# Patient Record
Sex: MALE | Race: WHITE | Employment: FULL TIME | URBAN - METROPOLITAN AREA
[De-identification: names, ages, dates, MRNs, and addresses within clinical notes are randomized per-mention and may not be internally consistent; named-entity substitution may affect disease eponyms.]

---

## 2017-01-10 ENCOUNTER — LAB ONLY (OUTPATIENT)
Dept: ENDOCRINOLOGY | Age: 32
End: 2017-01-10

## 2017-01-10 DIAGNOSIS — E11.9 TYPE 2 DIABETES MELLITUS WITHOUT COMPLICATION, WITHOUT LONG-TERM CURRENT USE OF INSULIN (HCC): Primary | ICD-10-CM

## 2017-01-11 LAB
ALBUMIN SERPL-MCNC: 4.7 G/DL (ref 3.5–5.5)
ALBUMIN/GLOB SERPL: 1.5 {RATIO} (ref 1.1–2.5)
ALP SERPL-CCNC: 71 IU/L (ref 39–117)
ALT SERPL-CCNC: 52 IU/L (ref 0–44)
AST SERPL-CCNC: 26 IU/L (ref 0–40)
BILIRUB SERPL-MCNC: 0.2 MG/DL (ref 0–1.2)
BUN SERPL-MCNC: 15 MG/DL (ref 6–20)
BUN/CREAT SERPL: 16 (ref 8–19)
CALCIUM SERPL-MCNC: 9.7 MG/DL (ref 8.7–10.2)
CHLORIDE SERPL-SCNC: 99 MMOL/L (ref 96–106)
CHOLEST SERPL-MCNC: 175 MG/DL (ref 100–199)
CO2 SERPL-SCNC: 20 MMOL/L (ref 18–29)
CREAT SERPL-MCNC: 0.92 MG/DL (ref 0.76–1.27)
EST. AVERAGE GLUCOSE BLD GHB EST-MCNC: 126 MG/DL
GLOBULIN SER CALC-MCNC: 3.2 G/DL (ref 1.5–4.5)
GLUCOSE SERPL-MCNC: 106 MG/DL (ref 65–99)
HBA1C MFR BLD: 6 % (ref 4.8–5.6)
HDLC SERPL-MCNC: 29 MG/DL
INTERPRETATION, 910389: NORMAL
LDLC SERPL CALC-MCNC: 115 MG/DL (ref 0–99)
POTASSIUM SERPL-SCNC: 4.4 MMOL/L (ref 3.5–5.2)
PROT SERPL-MCNC: 7.9 G/DL (ref 6–8.5)
SODIUM SERPL-SCNC: 141 MMOL/L (ref 134–144)
TRIGL SERPL-MCNC: 156 MG/DL (ref 0–149)
VLDLC SERPL CALC-MCNC: 31 MG/DL (ref 5–40)

## 2017-01-13 ENCOUNTER — OFFICE VISIT (OUTPATIENT)
Dept: ENDOCRINOLOGY | Age: 32
End: 2017-01-13

## 2017-01-13 VITALS
HEART RATE: 72 BPM | BODY MASS INDEX: 29.62 KG/M2 | HEIGHT: 69 IN | WEIGHT: 200 LBS | SYSTOLIC BLOOD PRESSURE: 101 MMHG | DIASTOLIC BLOOD PRESSURE: 70 MMHG

## 2017-01-13 DIAGNOSIS — E11.9 TYPE 2 DIABETES MELLITUS WITHOUT COMPLICATION, WITHOUT LONG-TERM CURRENT USE OF INSULIN (HCC): Primary | ICD-10-CM

## 2017-01-13 RX ORDER — METFORMIN HYDROCHLORIDE 500 MG/1
TABLET, EXTENDED RELEASE ORAL
Qty: 120 TAB | Refills: 11 | Status: SHIPPED | OUTPATIENT
Start: 2017-01-13 | End: 2017-10-05

## 2017-01-13 NOTE — MR AVS SNAPSHOT
Visit Information Date & Time Provider Department Dept. Phone Encounter #  
 1/13/2017 11:50 AM Enma Collins, [address] [place] [address] Diabetes and Endocrinology [phone] [id number] Follow-up Instructions Return in about 3 months (around 4/13/2017). Upcoming Health Maintenance Date Due  
 EYE EXAM RETINAL OR DILATED Q1 12/13/1996 Pneumococcal 19-64 Medium Risk (1 of 1 - PPSV23) 12/13/2005 DTaP/Tdap/Td series (1 - Tdap) 12/13/2007 INFLUENZA AGE 9 TO ADULT 8/1/2016 FOOT EXAM Q1 6/8/2017 HEMOGLOBIN A1C Q6M 7/10/2017 MICROALBUMIN Q1 10/11/2017 LIPID PANEL Q1 1/10/2018 Allergies as of 1/13/2017  Review Complete On: 1/13/2017 By: Enma Collins MD  
 No Known Allergies Current Immunizations  Never Reviewed No immunizations on file. Not reviewed this visit You Were Diagnosed With   
  
 Codes Comments Type 2 diabetes mellitus without complication, without long-term current use of insulin ([facility])    -  Primary ICD-10-CM: E11.9 ICD-9-CM: 250.00 Vitals BP Pulse Height(growth percentile) Weight(growth percentile) BMI Smoking Status 101/70 72 5' 9\" (1.753 m) 200 lb (90.7 kg) 29.53 kg/m2 Never Smoker Vitals History BMI and BSA Data Body Mass Index Body Surface Area  
 29.53 kg/m 2 2.1 m 2 Preferred Pharmacy Pharmacy Name Phone [facility]ia [facility] [address] [place] [address] Dr Ruth, [address] [place] [address]. [phone] Your Updated Medication List  
  
   
This list is accurate as of: 1/13/17 12:27 PM.  Always use your most recent med list.  
  
  
  
  
 CLARITIN 10 mg tablet Generic drug:  loratadine Take 10 mg by mouth daily. seasonally  
  
 metFORMIN  mg tablet Commonly known as:  GLUCOPHAGE XR Take 2 tabs with breakfast and 2 tabs with dinner. Prescriptions Sent to Pharmacy  Refills  
 metFORMIN ER (GLUCOPHAGE XR) 500 mg tablet 11  
 Sig: Take 2 tabs with breakfast and 2 tabs with dinner. Class: Normal  
 Pharmacy: Kristin Blizzard [address], [address] [place] [address].  #: [phone] We Performed the Following  DIABETES FOOT EXAM [7 Custom] Follow-up Instructions Return in about 3 months (around 4/13/2017). Introducing [facility]! Daljit Carnes introduces [employer] patient portal. Now you can access parts of your medical record, email your doctor's office, and request medication refills online. 1. In your internet browser, go to [email or web address] "[email or web address] 2. Click on the First Time User? Click Here link in the Sign In box. You will see the New Member Sign Up page. 3. Enter your [employer] Access Code exactly as it appears below. You will not need to use this code after youve completed the sign-up process. If you do not sign up before the expiration date, you must request a new code. · [employer] Access Code: [email or web address] Expires: 4/13/2017 12:27 PM 
 
4. Enter the last four digits of your Social Security Number (xxxx) and Date of Birth (mm/dd/yyyy) as indicated and click Submit. You will be taken to the next sign-up page. 5. Create a [employer] ID. This will be your [employer] login ID and cannot be changed, so think of one that is secure and easy to remember. 6. Create a [employer] password. You can change your password at any time. 7. Enter your Password Reset Question and Answer. This can be used at a later time if you forget your password. 8. Enter your e-mail address. You will receive e-mail notification when new information is available in [address]. 9. Click Sign Up. You can now view and download portions of your medical record. 10. Click the Download Summary menu link to download a portable copy of your medical information. If you have questions, please visit the Frequently Asked Questions section of the [employer] website.  Remember, [employer] is NOT to be used for urgent needs. For medical emergencies, dial 911. Now available from your iPhone and Android! Please provide this summary of care documentation to your next provider. Your primary care clinician is listed as NONE. If you have any questions after today's visit, please call [phone].

## 2017-01-13 NOTE — PROGRESS NOTES
Chief Complaint   Patient presents with    Diabetes     PCP is [facility]. Eye exam many years ago. History of Present Illness: Jenn Salomon. is a 28 y.o. male here for follow up of diabetes. Pt notes in January 2016 he was having issues of polyuria and polydipsia and was told by his friend, who is diabetic, was told he was probably diabetic as well. He notes that he checked his BG that day was 500. The next week he went to [facility] and his FGB was in the mid-200's and he was started on Metformin 500mg BID. A month later his FBG was 180's and his A1C was 10.6%. At our initial visit in March 2016 I tested pt for FRANK and Anti-insulin Ab, both of which were negative, his insulin and C-peptide levels were normal.  We switched him to Metformin XR 1000mg BID, because he was not tolerating the Metformin IR. At our last visit in October 2016 he was on Metformin XR 1000mg BID, his BGs were running in the 's range with no hypoglycemia. He notes he is tolerating the Metformin XR 1000mg BID. He has cut out simple starches and processed starches. His diet is primarily meat, fruit and vegetables. His BGs have been under 130 consistently. He denies issues of BG under 70 or symptoms of hypoglycemia. He has lost 10 pounds since October 2016. Pt is eating 2 meals daily. He wakes around 10-11AM.  His first meal of the day is around 10AM, yesterday he had eggs, peppers, onions and cheese and diet cranberry juice  His second meal of the day is typically around 6-7PM, yesterday he had two ground chicken patties and a salad (no buns) and diet soda. He will occasionally snack on popcorn or vegetables. Exercise consists of walking and hobbies of \"knife making\". He has since completed his first knife. He notes he wants to upgrade his equipment and \"make them real good\". No history of vascular disease. No history of retinopathy, neuropathy, or nephropathy.    Pt has hx of UC but not symptoms since the age of 17. Pt has been diagnosed with SHARON but is not using CPAP. Last saw his eye doctor 3 years ago. Current Outpatient Prescriptions   Medication Sig    metFORMIN ER (GLUCOPHAGE XR) 500 mg tablet Take 2 tabs with breakfast and 2 tabs with dinner.  loratadine (CLARITIN) 10 mg tablet Take 10 mg by mouth daily. seasonally     No current facility-administered medications for this visit. No Known Allergies  Review of Systems:  - Eyes: no blurry vision or double vision  - Cardiovascular: no chest pain  - Respiratory: no shortness of breath  - Musculoskeletal: no myalgias  - Neurological: no numbness/tingling in extremities    Physical Examination:  Blood pressure 101/70, pulse 72, height 5' 9\" (1.753 m), weight 200 lb (90.7 kg). - General: pleasant, no distress, good eye contact   - Neck: no carotid bruits  - Cardiovascular: regular, normal rate, nl s1 and s2, no m/r/g, 2+ DP pulses   - Respiratory: clear bilaterally  - Integumentary: no edema, no foot ulcers, sensation to monofilament and vibration intact bilaterally  - Psychiatric: normal mood and affect    Data Reviewed:   Component      Latest Ref Rng & Units 1/10/2017 1/10/2017 1/10/2017          11:13 AM 11:13 AM 11:13 AM   Glucose      65 - 99 mg/dL 106 (H)     BUN      6 - 20 mg/dL 15     Creatinine      0.76 - 1.27 mg/dL 0.92     GFR est non-AA      >59 mL/min/1.73 111     GFR est AA      >59 mL/min/1.73 129     BUN/Creatinine ratio      8 - 19 16     Sodium      134 - 144 mmol/L 141     Potassium      3.5 - 5.2 mmol/L 4.4     Chloride      96 - 106 mmol/L 99     CO2      18 - 29 mmol/L 20     Calcium      8.7 - 10.2 mg/dL 9.7     Protein, total      6.0 - 8.5 g/dL 7.9     Albumin      3.5 - 5.5 g/dL 4.7     GLOBULIN, TOTAL      1.5 - 4.5 g/dL 3.2     A-G Ratio      1.1 - 2.5 1.5     Bilirubin, total      0.0 - 1.2 mg/dL 0.2     Alk.  phosphatase      39 - 117 IU/L 71     AST      0 - 40 IU/L 26     ALT      0 - 44 IU/L 52 (H) Cholesterol, total      100 - 199 mg/dL  175    Triglyceride      0 - 149 mg/dL  156 (H)    HDL Cholesterol      >39 mg/dL  29 (L)    VLDL, calculated      5 - 40 mg/dL  31    LDL, calculated      0 - 99 mg/dL  115 (H)    Hemoglobin A1c, (calculated)      4.8 - 5.6 %   6.0 (H)   Estimated average glucose      mg/dL   126       Assessment/Plan:   1) DM > His BGs are at goal. Pt encouraged to keep up the good work. His BP is at goal on no medications. His LDL is 115 but . Pt is 29 y/o, his father has hx of CAD. We discussed risks and most recent guidelines and research. Will not start anti-lipid medications at this time, but will monitor his lipid periodically. RTC 3 months    Pt voices understanding and agreement with the plan. Follow-up Disposition:  Return in about 3 months (around 4/13/2017).

## 2017-04-14 ENCOUNTER — OFFICE VISIT (OUTPATIENT)
Dept: ENDOCRINOLOGY | Age: 32
End: 2017-04-14

## 2017-04-14 VITALS
DIASTOLIC BLOOD PRESSURE: 67 MMHG | BODY MASS INDEX: 28.35 KG/M2 | HEART RATE: 81 BPM | HEIGHT: 69 IN | WEIGHT: 191.4 LBS | SYSTOLIC BLOOD PRESSURE: 113 MMHG

## 2017-04-14 DIAGNOSIS — E11.9 TYPE 2 DIABETES MELLITUS WITHOUT COMPLICATION, WITHOUT LONG-TERM CURRENT USE OF INSULIN (HCC): Primary | ICD-10-CM

## 2017-04-14 LAB — HBA1C MFR BLD HPLC: 5.4 %

## 2017-04-14 RX ORDER — BISMUTH SUBSALICYLATE 262 MG
1 TABLET,CHEWABLE ORAL DAILY
COMMUNITY
End: 2018-06-19 | Stop reason: ALTCHOICE

## 2017-04-14 RX ORDER — LANOLIN ALCOHOL/MO/W.PET/CERES
1000 CREAM (GRAM) TOPICAL DAILY
COMMUNITY
End: 2018-07-19

## 2017-04-14 NOTE — PROGRESS NOTES
Chief Complaint   Patient presents with    Diabetes     [facility] is PCP. Pharmacy verified. No eye exam in years     History of Present Illness: Myranda Key. is a 28 y.o. male here for follow up of diabetes. Pt notes in January 2016 he was having issues of polyuria and polydipsia and was told by his friend, who is diabetic, was told he was probably diabetic as well. He notes that he checked his BG that day was 500. The next week he went to [facility] and his FGB was in the mid-200's and he was started on Metformin 500mg BID. A month later his FBG was 180's and his A1C was 10.6%. At our initial visit in March 2016 I tested pt for FRANK and Anti-insulin Ab, both of which were negative, his insulin and C-peptide levels were normal.  We switched him to Metformin XR 1000mg BID, because he was not tolerating the Metformin IR. At our last visit in January 2017 his A1C was 6.0% on Metformin XR 1000mg BID. His A1C today was 5.4%. Pt has lost an additional 9 pounds since January 2017. He has been changing his diet by cutting out processed foods and simple carbs. He has been eating more protein and vegetables. He notes he is tolerating the Metformin XR 1000mg BID. His BGs have been in the 80-90's. He denies issues of BG under 70 or symptoms of hypoglycemia. Pt is eating 2-3 meals daily. He is trying to increase his protein intake. He notes that with more meat he has had more constipation issues. He wakes around 8-10AM.  His first meal of the day is around 8-Noon, yesterday he had yogurt, sausage, onions, peppers and an apple. His second meal is around 4-5PM, when he is not at work he has \"fruit and meat\" at work he is having chicken tenders and coffee or water. His has dinner around 10PM, last night he had turkey burger patties, avocado and watermelon. He will occasionally snack on popcorn or vegetables. Exercise consists of walking and hobbies of \"knife making\".  He has since completed his first knife. He notes he wants to upgrade his equipment and \"make them real good\". No history of vascular disease. No history of retinopathy, neuropathy, or nephropathy. Pt has hx of UC but not symptoms since the age of 17. Pt has been diagnosed with SHARON but is not using CPAP. Last saw his eye doctor 4 years ago. In January 2017 his LDL is 115 but . Pt is 27 y/o, his father has hx of CAD. We discussed risks and most recent guidelines and research. Will not start anti-lipid medications at this time, but will monitor his lipid periodically. Current Outpatient Prescriptions   Medication Sig    cyanocobalamin (VITAMIN B-12) 1,000 mcg tablet Take 1,000 mcg by mouth daily. Unsure of exact strength    multivitamin (ONE A DAY) tablet Take 1 Tab by mouth daily.  metFORMIN ER (GLUCOPHAGE XR) 500 mg tablet Take 2 tabs with breakfast and 2 tabs with dinner.  loratadine (CLARITIN) 10 mg tablet Take 10 mg by mouth daily. seasonally     No current facility-administered medications for this visit. No Known Allergies  Review of Systems:  - Eyes: no blurry vision or double vision  - Cardiovascular: no chest pain  - Respiratory: no shortness of breath  - Musculoskeletal: no myalgias  - Neurological: no numbness/tingling in extremities    Physical Examination:  Height 5' 9\" (1.753 m), weight 191 lb 6.4 oz (86.8 kg). - General: pleasant, no distress, good eye contact   - Neck: no carotid bruits  - Cardiovascular: regular, normal rate, nl s1 and s2, no m/r/g, 2+ DP pulses   - Respiratory: clear bilaterally  - Integumentary: no edema, no foot ulcers, sensation to monofilament and vibration intact bilaterally  - Psychiatric: normal mood and affect    Data Reviewed:   His A1C today was 5.4%. Assessment/Plan:   1) DM > His A1C today was 5.4%, and he has lost an additional 9 pounds since January 2017. His BGs are at goal. Pt encouraged to keep up the good work.   His BP is at goal on no medications. His LDL is 115 but . Pt is 27 y/o, his father has hx of CAD. We discussed risks and most recent guidelines and research. Will not start anti-lipid medications at this time, but will monitor his lipid periodically. RTC 6 months    Pt voices understanding and agreement with the plan. Follow-up Disposition:  Return in about 6 months (around 10/14/2017).

## 2017-04-14 NOTE — MR AVS SNAPSHOT
Visit Information Date & Time Provider Department Dept. Phone Encounter #  
 4/14/2017  8:50 AM Olaf Das MD [facility] [phone] [id number] Follow-up Instructions Return in about 6 months (around 10/14/2017). Upcoming Health Maintenance Date Due  
 EYE EXAM RETINAL OR DILATED Q1 12/13/1996 Pneumococcal 19-64 Medium Risk (1 of 1 - PPSV23) 12/13/2005 DTaP/Tdap/Td series (1 - Tdap) 12/13/2007 INFLUENZA AGE 9 TO ADULT 8/1/2016 HEMOGLOBIN A1C Q6M 7/10/2017 MICROALBUMIN Q1 10/11/2017 LIPID PANEL Q1 1/10/2018 FOOT EXAM Q1 1/13/2018 Allergies as of 4/14/2017  Review Complete On: 4/14/2017 By: Olaf Das MD  
 No Known Allergies Current Immunizations  Never Reviewed No immunizations on file. Not reviewed this visit You Were Diagnosed With   
  
 Codes Comments Type 2 diabetes mellitus without complication, without long-term current use of insulin ([facility])    -  Primary ICD-10-CM: E11.9 ICD-9-CM: 250.00 Vitals BP Pulse Height(growth percentile) Weight(growth percentile) BMI Smoking Status 113/67 81 5' 9\" (1.753 m) 191 lb 6.4 oz (86.8 kg) 28.26 kg/m2 Never Smoker Vitals History BMI and BSA Data Body Mass Index Body Surface Area  
 28.26 kg/m 2 2.06 m 2 Preferred Pharmacy Pharmacy Name Phone [facility] [address] Dr Ruth, [address] [place] [address]. [phone] Your Updated Medication List  
  
   
This list is accurate as of: 4/14/17  9:16 AM.  Always use your most recent med list.  
  
  
  
  
 CLARITIN 10 mg tablet Generic drug:  loratadine Take 10 mg by mouth daily. seasonally  
  
 metFORMIN  mg tablet Commonly known as:  GLUCOPHAGE XR Take 2 tabs with breakfast and 2 tabs with dinner. multivitamin tablet Commonly known as:  ONE A DAY Take 1 Tab by mouth daily. VITAMIN B-12 1,000 mcg tablet Generic drug:  cyanocobalamin Take 1,000 mcg by mouth daily. Unsure of exact strength We Performed the Following AMB POC HEMOGLOBIN A1C [07763 CPT(R)] HM DIABETES FOOT EXAM [HM7 Custom] MD COLLECTION VENOUS BLOOD,VENIPUNCTURE [id number] CPT(R)] Follow-up Instructions Return in about 6 months (around 10/14/2017). Introducing [facility]! Julissa Jesus introduces [employer] patient portal. Now you can access parts of your medical record, email your doctor's office, and request medication refills online. 1. In your internet browser, go to [email or web address] 2. Click on the First Time User? Click Here link in the Sign In box. You will see the New Member Sign Up page. 3. Enter your [employer] Access Code exactly as it appears below. You will not need to use this code after youve completed the sign-up process. If you do not sign up before the expiration date, you must request a new code. · [employer] Access Code: [email or web address] Expires: 7/13/2017  9:16 AM 
 
4. Enter the last four digits of your Social Security Number (xxxx) and Date of Birth (mm/dd/yyyy) as indicated and click Submit. You will be taken to the next sign-up page. 5. Create a [employer] ID. This will be your [employer] login ID and cannot be changed, so think of one that is secure and easy to remember. 6. Create a [employer] password. You can change your password at any time. 7. Enter your Password Reset Question and Answer. This can be used at a later time if you forget your password. 8. Enter your e-mail address. You will receive e-mail notification when new information is available in [address] 9. Click Sign Up. You can now view and download portions of your medical record. 10. Click the Download Summary menu link to download a portable copy of your medical information. If you have questions, please visit the Frequently Asked Questions section of the [employer] website.  Remember, [employer] is NOT to be used for urgent needs. For medical emergencies, dial 911. Now available from your iPhone and Android! Please provide this summary of care documentation to your next provider. Your primary care clinician is listed as NONE. If you have any questions after today's visit, please call [phone].

## 2017-10-05 ENCOUNTER — OFFICE VISIT (OUTPATIENT)
Dept: ENDOCRINOLOGY | Age: 32
End: 2017-10-05

## 2017-10-05 VITALS
HEIGHT: 69 IN | BODY MASS INDEX: 29.15 KG/M2 | SYSTOLIC BLOOD PRESSURE: 98 MMHG | DIASTOLIC BLOOD PRESSURE: 73 MMHG | WEIGHT: 196.8 LBS | HEART RATE: 84 BPM

## 2017-10-05 DIAGNOSIS — E11.9 TYPE 2 DIABETES MELLITUS WITHOUT COMPLICATION, WITHOUT LONG-TERM CURRENT USE OF INSULIN (HCC): Primary | ICD-10-CM

## 2017-10-05 LAB — HBA1C MFR BLD HPLC: 5.6 %

## 2017-10-05 RX ORDER — METFORMIN HYDROCHLORIDE 1000 MG/1
1000 TABLET ORAL 2 TIMES DAILY WITH MEALS
Qty: 180 TAB | Refills: 3 | Status: SHIPPED | OUTPATIENT
Start: 2017-10-05 | End: 2018-07-19

## 2017-10-05 NOTE — PROGRESS NOTES
Chief Complaint   Patient presents with    Diabetes     No pcp. pharmacy verified. Eye exam overdue     History of Present Illness: Yassine Paredes. is a 28 y.o. male here for follow up of diabetes. Pt notes in January 2016 he was having issues of polyuria and polydipsia and was told by his friend, who is diabetic, was told he was probably diabetic as well. He notes that he checked his BG that day was 500. The next [facility] and his FGB was in the mid-200's and he was started on Metformin 500mg BID. A month later his FBG was 180's and his A1C was 10.6%. At our initial visit in March 2016 I tested pt for FRANK and Anti-insulin Ab, both of which were negative, his insulin and C-peptide levels were normal.  We switched him to Metformin XR 1000mg BID, because he was not tolerating the Metformin IR. At our last visit in April 2017 his A1C was 5.4% and he had lost an additional 9 pounds on Metformin XR 1000mg BID. His A1C today was 5.6%. He notes he is tolerating the Metformin XR 1000mg BID. His BGs have been in the 's. He denies issues of BG under 70 or symptoms of hypoglycemia. He had been eating more fried foods, had 8 pounds and started to transition back to the \"no processed food\" and more vegetables again. Pt is eating 2-3 meals daily. He wakes around 8-10AM.  He has breakfast 4 days per week around 8AM, yesterday he had trail mix of nuts and fruit and green tea. He has lunch around Noon-2PM, yesterday he had jalopenos and cheese (no breading) and lettuce wrap with deli meat and cheese and green tea. His has dinner after 5PM, last night he had lettuce wraps with meat and cheese. He will occasionally snack on \"natural foods\" like fruit and vegetables. Exercise consists of walking and hobbies of \"knife making\". No history of vascular disease. No history of retinopathy, neuropathy, or nephropathy. Pt has hx of UC but not symptoms since the age of 17.  Pt has been diagnosed with SHARON but is not using CPAP. Last saw his eye doctor 4 years ago. In January 2017 his LDL is 115 but . Pt is [address] [place] y/o, his father has hx of CAD. We have discussed risks and most recent guidelines and research. Current Outpatient Prescriptions   Medication Sig    cyanocobalamin (VITAMIN B-12) 1,000 mcg tablet Take 1,000 mcg by mouth daily. Unsure of exact strength    multivitamin (ONE A DAY) tablet Take 1 Tab by mouth daily.  metFORMIN ER (GLUCOPHAGE XR) 500 mg tablet Take 2 tabs with breakfast and 2 tabs with dinner.  loratadine (CLARITIN) 10 mg tablet Take 10 mg by mouth daily. seasonally     No current facility-administered medications for this visit. No Known Allergies  Review of Systems:  - Eyes: no blurry vision or double vision  - Cardiovascular: no chest pain  - Respiratory: no shortness of breath  - Musculoskeletal: no myalgias  - Neurological: no numbness/tingling in extremities    Physical Examination:  Blood pressure 98/73, pulse 84, height 5' 9\" (1.753 m), weight 196 lb 12.8 oz (89.3 kg). - General: pleasant, no distress, good eye contact   - Neck: no carotid bruits  - Cardiovascular: regular, normal rate, nl s1 and s2, no m/r/g, 2+ DP pulses   - Respiratory: clear bilaterally  - Integumentary: no edema, no foot ulcers, sensation to monofilament and vibration intact bilaterally  - Psychiatric: normal mood and affect    Data Reviewed:   His A1C today was 5.6%. Assessment/Plan:   1) DM > His A1C today was 5.6%. His BGs are at goal. Pt encouraged to keep up the good work. His BP is at goal on no medications. His LDL in January 2017 was 115 and his . Pt is [address] [place] y/o, his father has hx of CAD. We discussed risks and most recent guidelines and research. Will not start anti-lipid medications at this time, but will monitor his lipid periodically. RTC 6 months    Pt voices understanding and agreement with the plan.       Follow-up Disposition:  Return in about 6 months (around 4/5/2018).

## 2017-10-06 LAB
ALBUMIN/CREAT UR: 4 MG/G CREAT (ref 0–30)
CREAT UR-MCNC: 124.2 MG/DL
MICROALBUMIN UR-MCNC: 5 UG/ML

## 2018-02-19 ENCOUNTER — OFFICE VISIT (OUTPATIENT)
Dept: PRIMARY CARE CLINIC | Age: 33
End: 2018-02-19

## 2018-02-19 VITALS
TEMPERATURE: 98 F | RESPIRATION RATE: 17 BRPM | HEART RATE: 80 BPM | WEIGHT: 210.8 LBS | OXYGEN SATURATION: 98 % | BODY MASS INDEX: 31.22 KG/M2 | SYSTOLIC BLOOD PRESSURE: 121 MMHG | HEIGHT: 69 IN | DIASTOLIC BLOOD PRESSURE: 85 MMHG

## 2018-02-19 DIAGNOSIS — H60.501 ACUTE OTITIS EXTERNA OF RIGHT EAR, UNSPECIFIED TYPE: Primary | ICD-10-CM

## 2018-02-19 RX ORDER — CIPROFLOXACIN AND DEXAMETHASONE 3; 1 MG/ML; MG/ML
4 SUSPENSION/ DROPS AURICULAR (OTIC) 2 TIMES DAILY
Qty: 7.5 ML | Refills: 0 | Status: SHIPPED | OUTPATIENT
Start: 2018-02-19 | End: 2018-02-26

## 2018-02-19 NOTE — MR AVS SNAPSHOT
[place] 
 
 
 [address] [place] [address] 
[phone] Patient: Jarad Chery. MRN: [id number] :1985 Visit Information Date & Time Provider Department Dept. Phone Encounter #  
 2018  3:45 PM Joseph Colbert NP [address] [facility] 50 [facility] [id number] Follow-up Instructions Return if symptoms worsen or fail to improve. Your Appointments 2018  8:30 AM  
Follow Up with MD Veronica Martin [facility]: 6 month f/u Diabetes [facility] [address] [phone] [address] Upcoming Health Maintenance Date Due  
 EYE EXAM RETINAL OR DILATED Q1 1996 Pneumococcal 19-64 Medium Risk (1 of 1 - PPSV23) 2005 DTaP/Tdap/Td series (1 - Tdap) 2007 LIPID PANEL Q1 1/10/2018 HEMOGLOBIN A1C Q6M 2018 FOOT EXAM Q1 10/5/2018 MICROALBUMIN Q1 10/5/2018 Allergies as of 2018  Review Complete On: 2018 By: Joseph Colbert NP No Known Allergies Current Immunizations  Never Reviewed No immunizations on file. Not reviewed this visit You Were Diagnosed With   
  
 Codes Comments Acute otitis externa of right ear, unspecified type    -  Primary ICD-10-CM: H60.501 ICD-9-CM: 380.10 Vitals BP Pulse Temp Resp Height(growth percentile) Weight(growth percentile) 121/85 (BP 1 Location: Left arm, BP Patient Position: Sitting) 80 98 °F (36.7 °C) (Oral) 17 5' 9\" (1.753 m) 210 lb 12.8 oz (95.6 kg) SpO2 BMI Smoking Status 98% 31.13 kg/m2 Never Smoker Vitals History BMI and BSA Data Body Mass Index Body Surface Area  
 31.13 kg/m 2 2.16 m 2 Preferred Pharmacy Pharmacy Name Phone [address] [facility], [address] [place] [address]. [phone] Your Updated Medication List  
  
   
This list is accurate as of: 2/19/18  4:41 PM.  Always use your most recent med list.  
  
  
  
  
 ciprofloxacin-dexamethasone 0.3-0.1 % otic suspension Commonly known as:  Vazquez Napoles Administer 4 Drops in right ear two (2) times a day for 7 days. CLARITIN 10 mg tablet Generic drug:  loratadine Take 10 mg by mouth daily. seasonally  
  
 metFORMIN 1,000 mg tablet Commonly known as:  GLUCOPHAGE Take 1 Tab by mouth two (2) times daily (with meals). multivitamin tablet Commonly known as:  ONE A DAY Take 1 Tab by mouth daily. VITAMIN B-12 1,000 mcg tablet Generic drug:  cyanocobalamin Take 1,000 mcg by mouth daily. Unsure of exact strength Prescriptions Sent to Pharmacy Refills  
 ciprofloxacin-dexamethasone (CIPRODEX) 0.3-0.1 % otic suspension 0 Sig: Administer 4 Drops in right ear two (2) times a day for 7 days. Class: Normal  
 Pharmacy: [address] [facility], [address] [place] [address].  #: [phone] Route: Right Ear Follow-up Instructions Return if symptoms worsen or fail to improve. Patient Instructions Swimmer's Ear: Care Instructions Your Care Instructions Swimmer's ear (otitis externa) is inflammation or infection of the ear canal. This is the passage that leads from the outer ear to the eardrum. Any water, sand, or other debris that gets into the ear canal and stays there can cause swimmer's ear. Putting cotton swabs or other items in the ear to clean it can also cause this problem. Swimmer's ear can be very painful. But you can treat the pain and infection with medicines. You should feel better in a few days. Follow-up care is a key part of your treatment and safety.  Be sure to make and go to all appointments, and call your doctor if you are having problems. It's also a good idea to know your test results and keep a list of the medicines you take. How can you care for yourself at home? Cleaning and care · Use antibiotic drops as your doctor directs. · Do not insert ear drops (other than the antibiotic ear drops) or anything else into the ear unless your doctor has told you to. · Avoid getting water in the ear until the problem clears up. Use cotton lightly coated with petroleum jelly as an earplug. Do not use plastic earplugs. · Use a hair dryer set on low to carefully dry the ear after you shower. · To ease ear pain, hold a warm washcloth against your ear. · Take pain medicines exactly as directed. ¨ If the doctor gave you a prescription medicine for pain, take it as prescribed. ¨ If you are not taking a prescription pain medicine, ask your doctor if you can take an over-the-counter medicine. Inserting ear drops · Warm the drops to body temperature by rolling the container in your hands. Or you can place it in a cup of warm water for a few minutes. · Lie down, with your ear facing up. · Place drops inside the ear. Follow your doctor's instructions (or the directions on the label) for how many drops to use. Gently wiggle the outer ear or pull the ear up and back to help the drops get into the ear. · It's important to keep the liquid in the ear canal for 3 to 5 minutes. When should you call for help? Call your doctor now or seek immediate medical care if: 
? · You have a new or higher fever. ? · You have new or worse pain, swelling, warmth, or redness around or behind your ear. ? · You have new or increasing pus or blood draining from your ear. ? Watch closely for changes in your health, and be sure to contact your doctor if: 
? · You are not getting better after 2 days (48 hours). Where can you learn more? Go to http://joel-emil.info/.  
Enter C706 in the search box to learn more about \"Swimmer's Ear: Care Instructions. \" Current as of: May 12, 2017 Content Version: 11.4 © 0033-6842 Healthwise, [employer]. Care instructions adapted under license by [employer] (which disclaims liability or warranty for this information). If you have questions about a medical condition or this instruction, always ask your healthcare professional. [address] any warranty or liability for your use of this information. Introducing [facility]! [employer] introduces RelayRides patient portal. Now you can access parts of your medical record, email your doctor's office, and request medication refills online. 1. In your internet browser, go to [email or web address] 2. Click on the First Time User? Click Here link in the Sign In box. You will see the New Member Sign Up page. 3. Enter your RelayRides Access Code exactly as it appears below. You will not need to use this code after youve completed the sign-up process. If you do not sign up before the expiration date, you must request a new code. · RelayRides Access Code: [email or web address] Expires: 5/20/2018  4:40 PM 
 
4. Enter the last four digits of your Social Security Number (xxxx) and Date of Birth (mm/dd/yyyy) as indicated and click Submit. You will be taken to the next sign-up page. 5. Create a RelayRides ID. This will be your RelayRides login ID and cannot be changed, so think of one that is secure and easy to remember. 6. Create a RelayRides password. You can change your password at any time. 7. Enter your Password Reset Question and Answer. This can be used at a later time if you forget your password. 8. Enter your e-mail address. You will receive e-mail notification when new information is available in [address] 9. Click Sign Up. You can now view and download portions of your medical record. 10. Click the Download Summary menu link to download a portable copy of your medical information. If you have questions, please visit the Frequently Asked Questions section of the [employer] website. Remember, [employer] is NOT to be used for urgent needs. For medical emergencies, dial 911. Now available from your iPhone and Android! Please provide this summary of care documentation to your next provider. Your primary care clinician is listed as Matty Marx. If you have any questions after today's visit, please call [phone].

## 2018-02-19 NOTE — PROGRESS NOTES
Subjective:   Chris Abraham is a 33 y.o. male who complains of right ear pain for 1 day, stable since that time. He's on the phone often and uses headset often. Noticed some moisture in right ear. Some mild congestion and runny nose which he attributes to allergies. He denies a history of chills, fevers, shortness of breath, vomiting, wheezing and cough. Evaluation to date: none. Treatment to date: none. Patient does not smoke cigarettes. Relevant PMH:   Past Medical History:   Diagnosis Date    Diabetes ([address]     Gastrointestinal disorder     ulcerative colitis ?  Sleep disorder     apnea     Past Surgical History:   Procedure Laterality Date    HX OTHER SURGICAL      pylonidal cyst     No Known Allergies      Review of Systems  Pertinent items are noted in HPI. Objective:     Visit Vitals    /85 (BP 1 Location: Left arm, BP Patient Position: Sitting)    Pulse 80    Temp 98 °F (36.7 °C) (Oral)    Resp 17    Ht 5' 9\" (1.753 m)    Wt 210 lb 12.8 oz (95.6 kg)    SpO2 98%    BMI 31.13 kg/m2     General:  alert, cooperative, no distress   Eyes: negative   Ears: abnormal external canal AD - erythematous, edematous. Bilateral TM's normal.   Sinuses: Normal paranasal sinuses without tenderness   Mouth:  Lips, mucosa, and tongue normal. Teeth and gums normal and normal findings: oropharynx pink & moist without lesions or evidence of thrush   Neck: supple, symmetrical, trachea midline and no adenopathy. Heart: S1 and S2 normal, no murmurs noted. Lungs: clear to auscultation bilaterally        Assessment/Plan:       ICD-10-CM ICD-9-CM    1. Acute otitis externa of right ear, unspecified type H60.501 380.10      Orders Placed This Encounter    ciprofloxacin-dexamethasone (CIPRODEX) 0.3-0.1 % otic suspension     Suggested symptomatic OTC remedies. RTC prn. Kaya Banks NP  This note will not be viewable in [address]

## 2018-02-19 NOTE — PATIENT INSTRUCTIONS
Swimmer's Ear: Care Instructions  Your Care Instructions    Swimmer's ear (otitis externa) is inflammation or infection of the ear canal. This is the passage that leads from the outer ear to the eardrum. Any water, sand, or other debris that gets into the ear canal and stays there can cause swimmer's ear. Putting cotton swabs or other items in the ear to clean it can also cause this problem. Swimmer's ear can be very painful. But you can treat the pain and infection with medicines. You should feel better in a few days. Follow-up care is a key part of your treatment and safety. Be sure to make and go to all appointments, and call your doctor if you are having problems. It's also a good idea to know your test results and keep a list of the medicines you take. How can you care for yourself at home? Cleaning and care  · Use antibiotic drops as your doctor directs. · Do not insert ear drops (other than the antibiotic ear drops) or anything else into the ear unless your doctor has told you to. · Avoid getting water in the ear until the problem clears up. Use cotton lightly coated with petroleum jelly as an earplug. Do not use plastic earplugs. · Use a hair dryer set on low to carefully dry the ear after you shower. · To ease ear pain, hold a warm washcloth against your ear. · Take pain medicines exactly as directed. ¨ If the doctor gave you a prescription medicine for pain, take it as prescribed. ¨ If you are not taking a prescription pain medicine, ask your doctor if you can take an over-the-counter medicine. Inserting ear drops  · Warm the drops to body temperature by rolling the container in your hands. Or you can place it in a cup of warm water for a few minutes. · Lie down, with your ear facing up. · Place drops inside the ear. Follow your doctor's instructions (or the directions on the label) for how many drops to use.  Gently wiggle the outer ear or pull the ear up and back to help the drops get into the ear. · It's important to keep the liquid in the ear canal for 3 to 5 minutes. When should you call for help? Call your doctor now or seek immediate medical care if:  ? · You have a new or higher fever. ? · You have new or worse pain, swelling, warmth, or redness around or behind your ear. ? · You have new or increasing pus or blood draining from your ear. ? Watch closely for changes in your health, and be sure to contact your doctor if:  ? · You are not getting better after 2 days (48 hours). Where can you learn more? Go to http://joel-emil.info/. Enter C706 in the search box to learn more about \"Swimmer's Ear: Care Instructions. \"  Current as of: May 12, 2017  Content Version: 11.4  © 8043-0572 Healthwise, Incorporated. Care instructions adapted under license by [employer] (which disclaims liability or warranty for this information). If you have questions about a medical condition or this instruction, always ask your healthcare professional. [place] [address] any warranty or liability for your use of this information.

## 2018-02-19 NOTE — PROGRESS NOTES
Chief Complaint   Patient presents with    Ear Pain   c/o R ear pain since 10AM this morning, pt denies any drainage,denies taking anything for discomfort. This note will not be viewable in [address]

## 2018-04-03 DIAGNOSIS — E11.9 TYPE 2 DIABETES MELLITUS WITHOUT COMPLICATION, WITHOUT LONG-TERM CURRENT USE OF INSULIN (HCC): Primary | ICD-10-CM

## 2018-04-05 ENCOUNTER — OFFICE VISIT (OUTPATIENT)
Dept: ENDOCRINOLOGY | Age: 33
End: 2018-04-05

## 2018-04-05 VITALS
SYSTOLIC BLOOD PRESSURE: 113 MMHG | HEART RATE: 82 BPM | DIASTOLIC BLOOD PRESSURE: 79 MMHG | WEIGHT: 208.6 LBS | HEIGHT: 69 IN | BODY MASS INDEX: 30.9 KG/M2

## 2018-04-05 DIAGNOSIS — E11.9 TYPE 2 DIABETES MELLITUS WITHOUT COMPLICATION, WITHOUT LONG-TERM CURRENT USE OF INSULIN (HCC): Primary | ICD-10-CM

## 2018-04-05 LAB
ALBUMIN SERPL-MCNC: 4.7 G/DL (ref 3.5–5.5)
ALBUMIN/GLOB SERPL: 1.7 {RATIO} (ref 1.2–2.2)
ALP SERPL-CCNC: 67 IU/L (ref 39–117)
ALT SERPL-CCNC: 40 IU/L (ref 0–44)
AST SERPL-CCNC: 21 IU/L (ref 0–40)
BILIRUB SERPL-MCNC: 0.3 MG/DL (ref 0–1.2)
BUN SERPL-MCNC: 11 MG/DL (ref 6–20)
BUN/CREAT SERPL: 14 (ref 9–20)
CALCIUM SERPL-MCNC: 9.6 MG/DL (ref 8.7–10.2)
CHLORIDE SERPL-SCNC: 97 MMOL/L (ref 96–106)
CHOLEST SERPL-MCNC: 200 MG/DL (ref 100–199)
CO2 SERPL-SCNC: 26 MMOL/L (ref 18–29)
CREAT SERPL-MCNC: 0.78 MG/DL (ref 0.76–1.27)
EST. AVERAGE GLUCOSE BLD GHB EST-MCNC: 169 MG/DL
GFR SERPLBLD CREATININE-BSD FMLA CKD-EPI: 120 ML/MIN/1.73
GFR SERPLBLD CREATININE-BSD FMLA CKD-EPI: 139 ML/MIN/1.73
GLOBULIN SER CALC-MCNC: 2.8 G/DL (ref 1.5–4.5)
GLUCOSE SERPL-MCNC: 180 MG/DL (ref 65–99)
HBA1C MFR BLD: 7.5 % (ref 4.8–5.6)
HDLC SERPL-MCNC: 33 MG/DL
INTERPRETATION, 910389: NORMAL
LDLC SERPL CALC-MCNC: 120 MG/DL (ref 0–99)
Lab: NORMAL
POTASSIUM SERPL-SCNC: 4.1 MMOL/L (ref 3.5–5.2)
PROT SERPL-MCNC: 7.5 G/DL (ref 6–8.5)
SODIUM SERPL-SCNC: 138 MMOL/L (ref 134–144)
TRIGL SERPL-MCNC: 236 MG/DL (ref 0–149)
VLDLC SERPL CALC-MCNC: 47 MG/DL (ref 5–40)

## 2018-04-05 NOTE — PROGRESS NOTES
Chief Complaint   Patient presents with    Diabetes     pcp and pharmacy verified. Never had diabetic eye exam   Records since last visit reviewed  History of Present Illness: Светлана Zamora. is a 33 y.o. male here for follow up of diabetes. Pt notes in January 2016 he was having issues of polyuria and polydipsia and was told by his friend, who is diabetic, was told he was probably diabetic as well. He notes that he checked his BG that day was 500. The next week he went to [facility] and his FGB was in the mid-200's and he was started on Metformin 500mg BID. A month later his FBG was 180's and his A1C was 10.6%. At our initial visit in March 2016 I tested pt for FRANK and Anti-insulin Ab, both of which were negative, his insulin and C-peptide levels were normal.  We switched him to Metformin XR 1000mg BID, because he was not tolerating the Metformin IR. At our last visit in October 2017 his A1C was 5.6% on Metformin XR 1000mg BID. Pt was encouraged to keep up the good work. His A1C in April 2018 was 7.5%. Pt notes he is looking for houses in [facility], or [facility]. He notes he is tolerating the Metformin XR 1000mg BID. He notes \"my eating habits have not been good\". He has not been checking his blood sugars. Pt has gained 20 pounds since October 2017. Pt is eating 2-3 meals daily. He wakes around 8-10AM.  He rarely eats breakfast and when he does it is typically two oranges or a protein bar. yesterday he had trail mix of nuts and fruit and green tea. He has lunch around 1PM, He notes he has been eating more fast food, yesterday he had leftover vegetable, chicken and rice ravi and water. His has dinner after 5PM, last night he had a 3 tacos and chips and diet soda. He has not been snacking typically. He notes his biggest issue has been eating out more and eating more carbs. Exercise consists of walking and hobbies of \"knife making\".   No history of vascular disease. No history of retinopathy, neuropathy, or nephropathy. Pt has hx of UC but not symptoms since the age of 17. Pt has been diagnosed with SHARON but is not using CPAP. Last saw his eye doctor 4 years ago. In January 2018 his LDL is 120 but . Pt is 34 y/o, his father has hx of CAD. We have discussed risks and most recent guidelines and research. Current Outpatient Prescriptions   Medication Sig    metFORMIN (GLUCOPHAGE) 1,000 mg tablet Take 1 Tab by mouth two (2) times daily (with meals).  cyanocobalamin (VITAMIN B-12) 1,000 mcg tablet Take 1,000 mcg by mouth daily. Unsure of exact strength    multivitamin (ONE A DAY) tablet Take 1 Tab by mouth daily.  loratadine (CLARITIN) 10 mg tablet Take 10 mg by mouth daily. seasonally     No current facility-administered medications for this visit. No Known Allergies  Review of Systems:  - Eyes: no blurry vision or double vision  - Cardiovascular: no chest pain  - Respiratory: no shortness of breath  - Musculoskeletal: no myalgias  - Neurological: no numbness/tingling in extremities    Physical Examination:  Blood pressure 113/79, pulse 82, height 5' 9\" (1.753 m), weight 208 lb 9.6 oz (94.6 kg).   - General: pleasant, no distress, good eye contact   - Neck: no carotid bruits  - Cardiovascular: regular, normal rate, nl s1 and s2, no m/r/g, 2+ DP pulses   - Respiratory: clear bilaterally  - Integumentary: no edema, no foot ulcers, sensation to monofilament and vibration intact bilaterally  - Psychiatric: normal mood and affect    Data Reviewed:   Component      Latest Ref Rng & Units 4/4/2018 4/4/2018 4/4/2018 10/5/2017           8:26 AM  8:26 AM  8:26 AM 10:01 AM   Glucose      65 - 99 mg/dL   180 (H)    BUN      6 - 20 mg/dL   11    Creatinine      0.76 - 1.27 mg/dL   0.78    GFR est non-AA      >59 mL/min/1.73   120    GFR est AA      >59 mL/min/1.73   139    BUN/Creatinine ratio      9 - 20   14    Sodium      134 - 144 mmol/L   138 Potassium      3.5 - 5.2 mmol/L   4.1    Chloride      96 - 106 mmol/L   97    CO2      18 - 29 mmol/L   26    Calcium      8.7 - 10.2 mg/dL   9.6    Protein, total      6.0 - 8.5 g/dL   7.5    Albumin      3.5 - 5.5 g/dL   4.7    GLOBULIN, TOTAL      1.5 - 4.5 g/dL   2.8    A-G Ratio      1.2 - 2.2   1.7    Bilirubin, total      0.0 - 1.2 mg/dL   0.3    Alk. phosphatase      39 - 117 IU/L   67    AST      0 - 40 IU/L   21    ALT (SGPT)      0 - 44 IU/L   40    Cholesterol, total      100 - 199 mg/dL  200 (H)     Triglyceride      0 - 149 mg/dL  236 (H)     HDL Cholesterol      >39 mg/dL  33 (L)     VLDL, calculated      5 - 40 mg/dL  47 (H)     LDL, calculated      0 - 99 mg/dL  120 (H)     Creatinine, urine      Not Estab. mg/dL    124.2   Microalbumin, urine      Not Estab. ug/mL    5.0   Microalbumin/Creat. Ratio      0.0 - 30.0 mg/g creat    4.0   Hemoglobin A1c, (calculated)      4.8 - 5.6 % 7.5 (H)      Estimated average glucose      mg/dL 169          Assessment/Plan:   1) DM > His A1C today in April 2018 was 7.5%. He has been eating more fast food and high carb foods. Pt to work on lowering his carb intake, and increasing his physical activity, before we add any new medications. Will see pt back in 3 months. His BP is at goal on no medications. His LDL in January 2018 was 120 and his . Pt is 32 y/o, his father has hx of CAD. We discussed risks and most recent guidelines and research. Will not start anti-lipid medications at this time, but will monitor his lipid periodically. RTC 3 months    Pt voices understanding and agreement with the plan. Follow-up Disposition:  Return in about 3 months (around 7/5/2018).     CC Note:  Dr. Marlon Hatch

## 2018-06-18 ENCOUNTER — TELEPHONE (OUTPATIENT)
Dept: ENDOCRINOLOGY | Age: 33
End: 2018-06-18

## 2018-06-18 NOTE — TELEPHONE ENCOUNTER
Patient called to say that he has hives and was prescribed hydrocortisone cream.  This has caused his blood sugars to spike and he would like to ask if he should continue with the hydrocortisone cream?   His number is:  [phone].

## 2018-06-18 NOTE — TELEPHONE ENCOUNTER
Spoke with patient. He states that he had a beer and chicken wings last night. His blood sugar was 260 about an hour or so later. He states that his blood sugars \"never\" go that high. On 6/15/18, he developed hives (he thinks was stress related as is buying a house). He still had hives the next day. He started applying hydrocortisone cream on the hives x 2 days. He states that he applied the hydrocortisone cream \"all over, heavily\". He will check his blood sugar when he gets home and tomorrow. He will leave a message on my voicemail with the blood sugar readings. Reviewed some stress relieving strategies. Patient expressed understanding.

## 2018-06-19 ENCOUNTER — OFFICE VISIT (OUTPATIENT)
Dept: PRIMARY CARE CLINIC | Age: 33
End: 2018-06-19

## 2018-06-19 VITALS
SYSTOLIC BLOOD PRESSURE: 113 MMHG | OXYGEN SATURATION: 96 % | HEIGHT: 69 IN | HEART RATE: 97 BPM | RESPIRATION RATE: 18 BRPM | DIASTOLIC BLOOD PRESSURE: 81 MMHG | TEMPERATURE: 97.8 F | WEIGHT: 205 LBS | BODY MASS INDEX: 30.36 KG/M2

## 2018-06-19 DIAGNOSIS — L50.9 HIVES: Primary | ICD-10-CM

## 2018-06-19 NOTE — PROGRESS NOTES
Chief Complaint   Patient presents with   Alois Pucker     over thighs and upper arms for 4 days, has taken Benadryl, cortisone ointment, blood sugar has been elevated     he is a 33y.o. year old male who presents for evalution. He has hives off and on for the last 4 days, no visible hives now. He is worried about using hydrocortisone cream as it increased his blood sugar levels. I have reviewed possible allergens with him, and also he is under quite a bit of personal stress. He has not had hives in the past.    Reviewed PmHx, RxHx, FmHx, SocHx, AllgHx and updated and dated in the chart. Review of Systems - negative except as listed above in the HPI    Objective:     Vitals:    06/19/18 0836   BP: 113/81   Pulse: 97   Resp: 18   Temp: 97.8 °F (36.6 °C)   TempSrc: Oral   SpO2: 96%   Weight: 205 lb (93 kg)   Height: 5' 9\" (1.753 m)       Current Outpatient Prescriptions   Medication Sig    metFORMIN (GLUCOPHAGE) 1,000 mg tablet Take 1 Tab by mouth two (2) times daily (with meals).  cyanocobalamin (VITAMIN B-12) 1,000 mcg tablet Take 1,000 mcg by mouth daily. Unsure of exact strength    loratadine (CLARITIN) 10 mg tablet Take 10 mg by mouth daily. seasonally     No current facility-administered medications for this visit.         Physical Examination: General appearance - alert, well appearing, and in no distress  Mental status - alert, oriented to person, place, and time  Eyes - pupils equal and reactive, extraocular eye movements intact  Ears - bilateral TM's and external ear canals normal  Nose - normal and patent, no erythema, discharge or polyps  Mouth - mucous membranes moist, pharynx normal without lesions  Neck - supple, no significant adenopathy  Chest - clear to auscultation, no wheezes, rales or rhonchi, symmetric air entry  Heart - normal rate, regular rhythm, normal S1, S2, no murmurs, rubs, clicks or gallops  Extremities - peripheral pulses normal, no pedal edema, no clubbing or cyanosis  Skin - normal coloration and turgor, no rashes, no suspicious skin lesions noted      Assessment/ Plan:   Diagnoses and all orders for this visit:    1. Hives     Benadryl 50 mg every 6 hours until the hives stop, then taper off the benadryl gradually. I have reviewed with him possible causes, and avoidances, and especially how stress can cause a reaction like this. Follow-up Disposition:  Return if symptoms worsen or fail to improve. I have discussed the diagnosis with the patient and the intended plan as seen in the above orders. The patient has received an after-visit summary and questions were answered concerning future plans. Pt conveyed understanding of plan.     Medication Side Effects and Warnings were discussed with patient      Marlee Ponce NP

## 2018-06-19 NOTE — LETTER
NOTIFICATION RETURN TO WORK / SCHOOL 
 
6/19/2018 8:52 AM 
 
Mr. Adele [place]. 
[place] [phone] To Whom It May Concern: 
 
Gilmar Leos Renetta Martínez. is currently under the care of [address] [place] [address]. He will return to work/school on: 06/21/18 If there are questions or concerns please have the patient contact our office.  
 
 
 
Sincerely, 
 
 
Dilan Askew NP

## 2018-06-19 NOTE — PATIENT INSTRUCTIONS
Hives: Care Instructions  Your Care Instructions  Hives are raised, red, itchy patches of skin. They are also called wheals or welts. They usually have red borders and pale centers. Hives range in size from ¼ inch to 3 inches or more across. They may seem to move from place to place on the skin. Several hives may form a large area of raised, red skin. You can get hives after an insect sting, after taking medicine or eating certain foods, or because of infection or stress. Other causes include plants, things you breathe in, makeup, heat, cold, sunlight, and latex. You cannot spread hives to other people. Hives may last a few minutes or a few days, but a single spot may last less than 36 hours. Follow-up care is a key part of your treatment and safety. Be sure to make and go to all appointments, and call your doctor if you are having problems. It's also a good idea to know your test results and keep a list of the medicines you take. How can you care for yourself at home? · Avoid whatever you think may have caused your hives, such as a certain food or medicine. However, you may not know the cause. · Put a cool, wet towel on the area to relieve itching. · Take an over-the-counter antihistamine, such as diphenhydramine (Benadryl), cetirizine (Zyrtec), or loratadine (Claritin), to help stop the hives and calm the itching. Read and follow directions on the label. These medicines can make you feel sleepy. Do not drive while using them. · Stay away from strong soaps, detergents, and chemicals. These can make itching worse. When should you call for help? Call 911 anytime you think you may need emergency care. For example, call if:  ? · You have symptoms of a severe allergic reaction. These may include:  ¨ Sudden raised, red areas (hives) all over your body. ¨ Swelling of the throat, mouth, lips, or tongue. ¨ Trouble breathing. ¨ Passing out (losing consciousness).  Or you may feel very lightheaded or suddenly feel weak, confused, or restless. ?Call your doctor now or seek immediate medical care if:  ? · You have symptoms of an allergic reaction, such as:  ¨ A rash or hives (raised, red areas on the skin). ¨ Itching. ¨ Swelling. ¨ Belly pain, nausea, or vomiting. ? · You get hives after you start a new medicine. ? · Hives have not gone away after 24 hours. ? Watch closely for changes in your health, and be sure to contact your doctor if:  ? · You do not get better as expected. Where can you learn more? Go to http://joel-emil.info/. Enter T929 in the search box to learn more about \"Hives: Care Instructions. \"  Current as of: March 20, 2017  Content Version: 11.4  © 6257-1078 [employer]. Care instructions adapted under license by [employer] (which disclaims liability or warranty for this information). If you have questions about a medical condition or this instruction, always ask your healthcare professional. [place] [address] any warranty or liability for your use of this information.

## 2018-06-19 NOTE — TELEPHONE ENCOUNTER
Addendum: 6/19/2018, 11:21 AM  Patient left a VM stating that he ate 2 patties of hamburger with no buns and refried beans. His blood sugar was 168 after eating. This am his BS was 150. He stated that he is going to the company MD to see if there is some sort of infection he may have. [facility]   Spoke with patient at 12:15 pm. He states that the 168 BS reading was 2-3 hours after eating dinner last pm. He fasted all night. The 150 reading was fasting this am.  He did see the [employer] doctor and was told that the hydrocortisone cream may still be lingering in his body, causing higher BS readings. He has been advised by that doctor to go home, take 50 mg Benadryl TID until tomorrow, then taper to 25 mg throughout the day. The hives came back last night. Instructed patient to call back if his blood sugars continue to stay high. Patient agreed.      Colby Varner LPN

## 2018-06-19 NOTE — LETTER
NOTIFICATION RETURN TO WORK / SCHOOL 
 
6/19/2018 8:52 AM 
 
Mr. Laurent Ibrahim. 
Sarah Nunez [address] [address] To Whom It May Concern: 
 
Marce Mcgowan Renetta Mathew is currently under the care of [address] [place] [address]. He will return to work/school on: 06/20/18 If there are questions or concerns please have the patient contact our office.  
 
 
 
Sincerely, 
 
 
Preet Fu NP

## 2018-06-19 NOTE — PROGRESS NOTES
Chief Complaint   Patient presents with    Hives     over thighs and upper arms for 4 days, has taken Benadryl, cortisone ointment, blood sugar has been elevated

## 2018-06-19 NOTE — MR AVS SNAPSHOT
[facility] 
 
 
 [address] [place] [address] 83. 
[phone] Patient: Dulce Calderon. MRN: [id number] :1985 Visit Information Date & Time Provider Department Dept. Phone Encounter #  
 2018  8:15 AM Keyon Cage 74 [id number] Follow-up Instructions Return if symptoms worsen or fail to improve. Your Appointments 2018  8:30 AM  
Follow Up with MD Veronica Cruz Diabetes and Endocrinology [facility] Appt Note: 3 month f/u Diabetes [facility] [employer] [address] [phone] [address] [facility] Upcoming Health Maintenance Date Due  
 EYE EXAM RETINAL OR DILATED Q1 1996 Pneumococcal 19- Medium Risk (1 of 1 - PPSV23) 2005 DTaP/Tdap/Td series (1 - Tdap) 2007 Influenza Age 5 to Adult 2018 HEMOGLOBIN A1C Q6M 10/4/2018 MICROALBUMIN Q1 10/5/2018 LIPID PANEL Q1 2019 FOOT EXAM Q1 2019 Allergies as of 2018  Review Complete On: 2018 By: Ezekiel Neumann LPN No Known Allergies Current Immunizations  Never Reviewed No immunizations on file. Not reviewed this visit You Were Diagnosed With   
  
 Codes Comments Hives    -  Primary ICD-10-CM: L50.9 ICD-9-CM: 708. 9 Vitals BP Pulse Temp Resp Height(growth percentile) Weight(growth percentile) 113/81 97 97.8 °F (36.6 °C) (Oral) 18 5' 9\" (1.753 m) 205 lb (93 kg) SpO2 BMI Smoking Status 96% 30.27 kg/m2 Never Smoker BMI and BSA Data Body Mass Index Body Surface Area  
 30.27 kg/m 2 2.13 m 2 Preferred Pharmacy Pharmacy Name Phone Jose Dubois [address], [address] [place] [address]. [phone] Your Updated Medication List  
  
   
 This list is accurate as of 6/19/18  8:54 AM.  Always use your most recent med list.  
  
  
  
  
 CLARITIN 10 mg tablet Generic drug:  loratadine Take 10 mg by mouth daily. seasonally  
  
 metFORMIN 1,000 mg tablet Commonly known as:  GLUCOPHAGE Take 1 Tab by mouth two (2) times daily (with meals). VITAMIN B-12 1,000 mcg tablet Generic drug:  cyanocobalamin Take 1,000 mcg by mouth daily. Unsure of exact strength Follow-up Instructions Return if symptoms worsen or fail to improve. Patient Instructions Hives: Care Instructions Your Care Instructions Hives are raised, red, itchy patches of skin. They are also called wheals or welts. They usually have red borders and pale centers. Hives range in size from ¼ inch to 3 inches or more across. They may seem to move from place to place on the skin. Several hives may form a large area of raised, red skin. You can get hives after an insect sting, after taking medicine or eating certain foods, or because of infection or stress. Other causes include plants, things you breathe in, makeup, heat, cold, sunlight, and latex. You cannot spread hives to other people. Hives may last a few minutes or a few days, but a single spot may last less than 36 hours. Follow-up care is a key part of your treatment and safety. Be sure to make and go to all appointments, and call your doctor if you are having problems. It's also a good idea to know your test results and keep a list of the medicines you take. How can you care for yourself at home? · Avoid whatever you think may have caused your hives, such as a certain food or medicine. However, you may not know the cause. · Put a cool, wet towel on the area to relieve itching. · Take an over-the-counter antihistamine, such as diphenhydramine (Benadryl), cetirizine (Zyrtec), or loratadine (Claritin), to help stop the hives and calm the itching. Read and follow directions on the label. These medicines can make you feel sleepy. Do not drive while using them. · Stay away from strong soaps, detergents, and chemicals. These can make itching worse. When should you call for help? Call 911 anytime you think you may need emergency care. For example, call if: 
? · You have symptoms of a severe allergic reaction. These may include: 
¨ Sudden raised, red areas (hives) all over your body. ¨ Swelling of the throat, mouth, lips, or tongue. ¨ Trouble breathing. ¨ Passing out (losing consciousness). Or you may feel very lightheaded or suddenly feel weak, confused, or restless. ?Call your doctor now or seek immediate medical care if: 
? · You have symptoms of an allergic reaction, such as: ¨ A rash or hives (raised, red areas on the skin). ¨ Itching. ¨ Swelling. ¨ Belly pain, nausea, or vomiting. ? · You get hives after you start a new medicine. ? · Hives have not gone away after 24 hours. ? Watch closely for changes in your health, and be sure to contact your doctor if: 
? · You do not get better as expected. Where can you learn more? Go to http://joel-emil.info/. Enter Y754 in the search box to learn more about \"Hives: Care Instructions. \" Current as of: March 20, 2017 Content Version: 11.4 © 1579-2679 [employer]. Care instructions adapted under license by [employer] (which disclaims liability or warranty for this information). If you have questions about a medical condition or this instruction, always ask your healthcare professional. [place] [address] any warranty or liability for your use of this information. Introducing \[facility]\"" & HEALTH SERVICES! [employer] introduces [employer] patient portal. Now you can access parts of your medical record, email your doctor's office, and request medication refills online. 1. In your internet browser, go to [email or web address] 2. Click on the First Time User? Click Here link in the Sign In box. You will see the New Member Sign Up page. 3. Enter your [employer] Access Code exactly as it appears below. You will not need to use this code after youve completed the sign-up process. If you do not sign up before the expiration date, you must request a new code. · [employer] Access Code: [email or web address] Expires: 9/17/2018  8:54 AM 
 
4. Enter the last four digits of your Social Security Number (xxxx) and Date of Birth (mm/dd/yyyy) as indicated and click Submit. You will be taken to the next sign-up page. 5. Create a [employer] ID. This will be your [employer] login ID and cannot be changed, so think of one that is secure and easy to remember. 6. Create a [employer] password. You can change your password at any time. 7. Enter your Password Reset Question and Answer. This can be used at a later time if you forget your password. 8. Enter your e-mail address. You will receive e-mail notification when new information is available in [address] 9. Click Sign Up. You can now view and download portions of your medical record. 10. Click the Download Summary menu link to download a portable copy of your medical information. If you have questions, please visit the Frequently Asked Questions section of the [employer] website. Remember, [employer] is NOT to be used for urgent needs. For medical emergencies, dial 911. Now available from your iPhone and Android! Please provide this summary of care documentation to your next provider. Your primary care clinician is listed as [email or web address]. If you have any questions after today's visit, please call [phone].

## 2018-06-21 ENCOUNTER — TELEPHONE (OUTPATIENT)
Dept: ENDOCRINOLOGY | Age: 33
End: 2018-06-21

## 2018-06-21 NOTE — TELEPHONE ENCOUNTER
It is not a common reaction to the Metformin, but some people can have hives as a reaction to the Metformin. Stop taking the Metformin for a week, to see if the hives go away. If they do, restart the Metformin and if the rash returns we will have to stop it.

## 2018-06-21 NOTE — TELEPHONE ENCOUNTER
Spoke with patient. He states that this am his Blood sugar was 110-120 a hour or so after eating 1 ravioli and a \"sip\" of sweet tea. This am his BS was 156 fasting. He still is getting hives especially after eating. He takes his Metformin after eating. He is wondering if Metformin may be causing his hives. If he is to continue Metformin, he wonders if he can get a new Rx for Extended release Metformin, as he is wondering if it may control his blood sugars better.

## 2018-06-21 NOTE — TELEPHONE ENCOUNTER
Patient is calling in regards to his blood sugars, he states that he is experiencing ups and downs  in the range of 130-156. Patient also wanted you to know that he keeps his metformin in his car, is concerned if :hot weather could effect the medicine? Patient contact:  [phone].

## 2018-07-19 ENCOUNTER — OFFICE VISIT (OUTPATIENT)
Dept: ENDOCRINOLOGY | Age: 33
End: 2018-07-19

## 2018-07-19 VITALS
HEART RATE: 89 BPM | WEIGHT: 204 LBS | BODY MASS INDEX: 30.21 KG/M2 | HEIGHT: 69 IN | DIASTOLIC BLOOD PRESSURE: 82 MMHG | SYSTOLIC BLOOD PRESSURE: 118 MMHG

## 2018-07-19 DIAGNOSIS — E11.9 TYPE 2 DIABETES MELLITUS WITHOUT COMPLICATION, WITHOUT LONG-TERM CURRENT USE OF INSULIN (HCC): Primary | ICD-10-CM

## 2018-07-19 LAB — HBA1C MFR BLD HPLC: 7.4 %

## 2018-07-19 RX ORDER — METFORMIN HYDROCHLORIDE 1000 MG/1
TABLET, FILM COATED, EXTENDED RELEASE ORAL
Qty: 60 TAB | Refills: 5 | Status: SHIPPED | OUTPATIENT
Start: 2018-07-19 | End: 2018-07-19 | Stop reason: CLARIF

## 2018-07-19 RX ORDER — CETIRIZINE HCL 10 MG
10-20 TABLET ORAL
COMMUNITY

## 2018-07-19 NOTE — MR AVS SNAPSHOT
[address] [place] [address] [address] [address] [phone] Patient: Merline Storey MRN: [id number] :1985 Visit Information Date & Time Provider Department Dept. Phone Encounter #  
 2018  8:30 AM Taylor Ludwig, [address] [place] [address] Diabetes and Endocrinology [id number] Follow-up Instructions Return in about 3 months (around 10/19/2018). Upcoming Health Maintenance Date Due  
 EYE EXAM RETINAL OR DILATED Q1 1996 Pneumococcal 19-64 Medium Risk (1 of 1 - PPSV23) 2005 DTaP/Tdap/Td series (1 - Tdap) 2007 Influenza Age 5 to Adult 2018 HEMOGLOBIN A1C Q6M 10/4/2018 MICROALBUMIN Q1 10/5/2018 LIPID PANEL Q1 2019 FOOT EXAM Q1 2019 Allergies as of 2018  Review Complete On: 2018 By: Taylor Ludwig MD  
 No Known Allergies Current Immunizations  Never Reviewed No immunizations on file. Not reviewed this visit You Were Diagnosed With   
  
 Codes Comments Type 2 diabetes mellitus without complication, without long-term current use of insulin (HCC)    -  Primary ICD-10-CM: E11.9 ICD-9-CM: 250.00 Vitals BP Pulse Height(growth percentile) Weight(growth percentile) BMI Smoking Status 118/82 89 5' 9\" (1.753 m) 204 lb (92.5 kg) 30.13 kg/m2 Never Smoker BMI and BSA Data Body Mass Index Body Surface Area  
 30.13 kg/m 2 2.12 m 2 Preferred Pharmacy Pharmacy Name Phone Paul Andino [address] [place] [address] Dr Ruth, [address] [place] [address]. [phone] Your Updated Medication List  
  
   
This list is accurate as of 18  9:04 AM.  Always use your most recent med list.  
  
  
  
  
 metFORMIN 1,000 mg Tg24 24 hour tablet Commonly known as:  [place] Devoid Twice per day ZyrTEC 10 mg tablet Generic drug:  cetirizine Take  by mouth. Prescriptions Sent to Pharmacy Refills  
 metFORMIN (GLUMETZA) 1,000 mg TG24 24 hour tablet 5 Sig: Twice per day Class: Normal  
 Pharmacy: Jas Daniels [address], [address] [place] [address] RD.  #: [phone] We Performed the Following AMB POC HEMOGLOBIN A1C [90309 CPT(R)] HM DIABETES FOOT EXAM [7 Custom] MICROALBUMIN, UR, RAND W/ MICROALB/CREAT RATIO [id number] CPT(R)] Follow-up Instructions Return in about 3 months (around 10/19/2018). To-Do List   
 10/19/2018 Lab:  HEMOGLOBIN A1C WITH EAG   
  
 10/19/2018 Lab:  LIPID PANEL   
  
 10/19/2018 Lab:  METABOLIC PANEL, COMPREHENSIVE Introducing \[facility]\"" & HEALTH SERVICES! [facility] introduces [employer] patient portal. Now you can access parts of your medical record, email your doctor's office, and request medication refills online. 1. In your internet browser, go to [email or web address] 2. Click on the First Time User? Click Here link in the Sign In box. You will see the New Member Sign Up page. 3. Enter your [employer] Access Code exactly as it appears below. You will not need to use this code after youve completed the sign-up process. If you do not sign up before the expiration date, you must request a new code. · [employer] Access Code: [email or web address] Expires: 9/17/2018  8:54 AM 
 
4. Enter the last four digits of your Social Security Number (xxxx) and Date of Birth (mm/dd/yyyy) as indicated and click Submit. You will be taken to the next sign-up page. 5. Create a [employer] ID. This will be your [employer] login ID and cannot be changed, so think of one that is secure and easy to remember. 6. Create a [employer] password. You can change your password at any time. 7. Enter your Password Reset Question and Answer. This can be used at a later time if you forget your password. 8. Enter your e-mail address. You will receive e-mail notification when new information is available in [address] 9. Click Sign Up. You can now view and download portions of your medical record. 10. Click the Download Summary menu link to download a portable copy of your medical information. If you have questions, please visit the Frequently Asked Questions section of the [employer] website. Remember, [employer] is NOT to be used for urgent needs. For medical emergencies, dial 911. Now available from your iPhone and Android! Please provide this summary of care documentation to your next provider. If you have any questions after today's visit, please call [phone].

## 2018-07-19 NOTE — PROGRESS NOTES
Chief Complaint   Patient presents with    Diabetes     pcp and pharmacy verified. Release signed for eye exam.   Records since last visit reviewed  History of Present Illness: Caleb Frias is a 33 y.o. male here for follow up of diabetes. Pt notes in January 2016 he was having issues of polyuria and polydipsia and was told by his friend, who is diabetic, was told he was probably diabetic as well. He notes that he checked his BG that day was 500. The next week he went to pt first and his FGB was in the mid-200's and he was started on Metformin 500mg BID. A month later his FBG was 180's and his A1C was 10.6%. At our initial visit in March 2016 I tested pt for FRANK and Anti-insulin Ab, both of which were negative, his insulin and C-peptide levels were normal.  We switched him to Metformin XR 1000mg BID, because he was not tolerating the Metformin IR. At our last visit in April 2018 his A1C was 7.5% on Metformin XR 1000mg BID. Pt was encouraged to work on lowering his carb intake and increasing his physical activity before we made any changes to his medical regimen. In June 2018 pt called stating he had developed a rash and was concerned it could be due to his Metformin. I recommended he stop the Metformin for a week to see if the rash improved and then restart the metformin to see if the rash came back. He notes he stopped the Metformin for about a week ahd he notes his BGs were staying high. He was using a lot of topical hydrocortisone. Once he restarted the Metformin his BGs did improve some. He is still taking his Metformin 1000mg XR BID. \"I have not been eating horrifically, but I have not been eating like I should\". The closed on a house on [address] this week. His A1C in April 2018 was 7.4%. He has lost 6 pounds since February 2018. \"Now that we can settle down and be normal people, we want to get our diets in order\". Pt is eating 2-3 meals daily.    He wakes around 8-10AM.  He rarely eats breakfast and when he does it is typically two oranges or a protein bar. He has lunch around 1PM, yesterday he did not eat lunch, but he typically has chicken tenders or grilled chicken salad. His has dinner after 5PM, last night he had a burrito and chips and diet soda. He has not been snacking typically. Exercise consists of walking and hobbies of \"knife making\". No history of vascular disease. No history of retinopathy, neuropathy, or nephropathy. Pt has hx of UC but not symptoms since the age of 17. Pt has been diagnosed with SHARON but is not using CPAP. Last saw his eye doctor May 2018, \"everything looked good\". Will request these records. In January 2018 his LDL is 120 but . Pt is 34 y/o, his father has hx of CAD. We have discussed risks and most recent guidelines and research. Current Outpatient Prescriptions   Medication Sig    cetirizine (ZYRTEC) 10 mg tablet Take  by mouth.  metFORMIN (GLUCOPHAGE) 1,000 mg tablet Take 1 Tab by mouth two (2) times daily (with meals). No current facility-administered medications for this visit. No Known Allergies  Review of Systems:  - Eyes: no blurry vision or double vision  - Cardiovascular: no chest pain  - Respiratory: no shortness of breath  - Musculoskeletal: no myalgias  - Neurological: no numbness/tingling in extremities    Physical Examination:  Blood pressure 118/82, pulse 89, height 5' 9\" (1.753 m), weight 204 lb (92.5 kg).   - General: pleasant, no distress, good eye contact   - Neck: no carotid bruits  - Cardiovascular: regular, normal rate, nl s1 and s2, no m/r/g, 2+ DP pulses   - Respiratory: clear bilaterally  - Integumentary: no edema, no foot ulcers, sensation to monofilament and vibration intact bilaterally  - Psychiatric: normal mood and affect    Diabetic foot exam:     Left Foot:   Visual Exam: normal    Pulse DP: 2+ (normal)   Filament test: normal sensation    Vibratory sensation: normal      Right Foot:   Visual Exam: normal    Pulse DP: 2+ (normal)   Filament test: normal sensation    Vibratory sensation: normal        Data Reviewed:   His A1C today was 7.4%    Assessment/Plan:   1) DM > His A1C today was 7.4%. He has been through a major life change so we agreed to give him 3 months to work on lifestyle and dietary changes. Pt to work on lowering his carb intake, and increasing his physical activity, before we add any new medications. Will see pt back in 3 months. His BP is at goal on no medications. His LDL in January 2018 was 120 and his . Pt is 34 y/o, his father has hx of CAD. We discussed risks and most recent guidelines and research. Will not start anti-lipid medications at this time, but will monitor his lipid periodically. RTC 3 months    Pt voices understanding and agreement with the plan. Follow-up Disposition:  Return in about 3 months (around 10/19/2018).

## 2018-07-21 LAB
ALBUMIN/CREAT UR: 5 MG/G CREAT (ref 0–30)
CREAT UR-MCNC: 91.9 MG/DL
MICROALBUMIN UR-MCNC: 4.6 UG/ML

## 2018-10-19 DIAGNOSIS — E11.9 TYPE 2 DIABETES MELLITUS WITHOUT COMPLICATION, WITHOUT LONG-TERM CURRENT USE OF INSULIN (HCC): ICD-10-CM

## 2018-10-26 ENCOUNTER — OFFICE VISIT (OUTPATIENT)
Dept: ENDOCRINOLOGY | Age: 33
End: 2018-10-26

## 2018-10-26 VITALS
DIASTOLIC BLOOD PRESSURE: 80 MMHG | BODY MASS INDEX: 28.41 KG/M2 | HEIGHT: 69 IN | SYSTOLIC BLOOD PRESSURE: 109 MMHG | HEART RATE: 98 BPM | WEIGHT: 191.8 LBS

## 2018-10-26 DIAGNOSIS — E11.9 TYPE 2 DIABETES MELLITUS WITHOUT COMPLICATION, WITHOUT LONG-TERM CURRENT USE OF INSULIN (HCC): Primary | ICD-10-CM

## 2018-10-26 LAB — HBA1C MFR BLD HPLC: 10.6 %

## 2018-10-26 RX ORDER — GLIPIZIDE 5 MG/1
5 TABLET ORAL 2 TIMES DAILY
Qty: 60 TAB | Refills: 3 | Status: SHIPPED | OUTPATIENT
Start: 2018-10-26 | End: 2019-03-03 | Stop reason: SDUPTHER

## 2018-10-26 NOTE — PATIENT INSTRUCTIONS
1) continue your Metformin 2) Start Glipizide one pill with breakfast and one pill with dinner. If you do not eat anything in the morning, then don't take the Glipizide that morning.

## 2018-10-26 NOTE — PROGRESS NOTES
Chief Complaint Patient presents with  Diabetes  
  pcp and pharmacy verified. Eye exam UTD Records since last visit reviewed History of Present Illness: Jonatan Tate. is a 33 y.o. male here for follow up of diabetes. Pt notes in January 2016 he was having issues of polyuria and polydipsia and was told by his friend, who is diabetic, was told he was probably diabetic as well. He notes that he checked his BG that day was 500. The next [facility] and his FGB was in the mid-200's and he was started on Metformin 500mg BID. A month later his FBG was 180's and his A1C was 10.6%. At our initial visit in March 2016 I tested pt for FRANK and Anti-insulin Ab, both of which were negative, his insulin and C-peptide levels were normal. 
We switched him to Metformin XR 1000mg BID, because he was not tolerating the Metformin IR. At our last visit in July 2018 his A1C was 7.4% on Metformin XR 1000mg BID. He has lost 6 pounds. He noted that he had been going through a lot of life changes and stressors and wanted to work on lifestyle changes, before adjusting his medications. He notes the new house is doing well. His A1C today was 10.6%. \"My diet has been terrible, we have been eating out a lot\". Pt is taking his Metformin XR 1000mg BID. Pt has lost 14 pounds since our last visit. He notes he has been more tired and fatigued for the last few weeks. Pt is eating 2-3 meals daily. He wakes around 8-10AM. He rarely eats breakfast and when he does it is typically a protein bar. He has lunch around 1PM, yesterday he had [employer] chicken, no rice and diet soda. His has dinner between 6-10PM, last night he had an omlet with tomatoes and unsweetened tea. He has not been snacking typically. Exercise consists of walking his foster dog. He has not been working on his [employer]". No history of vascular disease. No history of retinopathy, neuropathy, or nephropathy. Pt has hx of UC but not symptoms since the age of 17. Pt has been diagnosed with SHARON but is not using CPAP. Last saw his eye doctor May 2018, \"everything looked good\". In January 2018 his LDL is 120 but . Pt is 34 y/o, his father has hx of CAD. We have discussed risks and most recent guidelines and research. Current Outpatient Medications Medication Sig  
 glipiZIDE (GLUCOTROL) 5 mg tablet Take 1 Tab by mouth two (2) times a day.  cetirizine (ZYRTEC) 10 mg tablet Take  by mouth.  metFORMIN ER (GLUCOPHAGE XR) 500 mg tablet Two pills with breakfast and two pills with dinner. No current facility-administered medications for this visit. No Known Allergies Review of Systems: - Eyes: no blurry vision or double vision - Cardiovascular: no chest pain - Respiratory: no shortness of breath - Musculoskeletal: no myalgias - Neurological: no numbness/tingling in extremities Physical Examination: 
Blood pressure 109/80, pulse 98, height 5' 9\" (1.753 m), weight 191 lb 12.8 oz (87 kg). - General: pleasant, no distress, good eye contact  
- Neck: no carotid bruits - Cardiovascular: regular, normal rate, nl s1 and s2, no m/r/g, 2+ DP pulses - Respiratory: clear bilaterally - Integumentary: no edema, no foot ulcers,  
- Psychiatric: normal mood and affect Diabetic foot exam:  
 
Left Foot: 
 Visual Exam: normal  
 Pulse DP: 2+ (normal) Filament test: normal sensation Vibratory sensation: normal 
   
Right Foot: 
 Visual Exam: normal  
 Pulse DP: 2+ (normal) Filament test: normal sensation Vibratory sensation: normal 
 
 
 
Data Reviewed:  
His A1C today was 10.6% Assessment/Plan:  
1) DM > His A1C today was 10.6%. We discussed at length the importance of dietary and lifestyle changes and increasing his physical activity. Will continue the Metformin and start pt on Glipizide 5mg BID. His BP is at goal on no medications. His LDL in January 2018 was 120 and his . Pt is 32 y/o, his father has hx of CAD. We discussed risks and most recent guidelines and research. Will not start anti-lipid medications at this time, but will monitor his lipid periodically. RTC 3 months Pt voices understanding and agreement with the plan. Follow-up Disposition: 
Return in about 3 months (around 1/26/2019).

## 2019-01-26 DIAGNOSIS — E11.9 TYPE 2 DIABETES MELLITUS WITHOUT COMPLICATION, WITHOUT LONG-TERM CURRENT USE OF INSULIN (HCC): ICD-10-CM

## 2019-02-01 RX ORDER — METFORMIN HYDROCHLORIDE 500 MG/1
TABLET, EXTENDED RELEASE ORAL
Qty: 120 TAB | Refills: 4 | Status: SHIPPED | OUTPATIENT
Start: 2019-02-01 | End: 2019-06-04 | Stop reason: SDUPTHER

## 2019-02-14 ENCOUNTER — OFFICE VISIT (OUTPATIENT)
Dept: ENDOCRINOLOGY | Age: 34
End: 2019-02-14

## 2019-02-14 VITALS
HEIGHT: 69 IN | SYSTOLIC BLOOD PRESSURE: 104 MMHG | WEIGHT: 188.4 LBS | HEART RATE: 94 BPM | BODY MASS INDEX: 27.91 KG/M2 | DIASTOLIC BLOOD PRESSURE: 80 MMHG

## 2019-02-14 DIAGNOSIS — E11.9 TYPE 2 DIABETES MELLITUS WITHOUT COMPLICATION, WITHOUT LONG-TERM CURRENT USE OF INSULIN (HCC): Primary | ICD-10-CM

## 2019-02-14 LAB — HBA1C MFR BLD HPLC: 10.3 %

## 2019-02-14 NOTE — PROGRESS NOTES
Chief Complaint Patient presents with  Diabetes No PCP. Pharmacy verified. Eye exam UTD Records since last visit reviewed History of Present Illness: Kaya Miller is a 29 y.o. male here for follow up of diabetes. Pt notes in January 2016 he was having issues of polyuria and polydipsia and was told by his friend, who is diabetic, was told he was probably diabetic as well. He notes that he checked his BG that day was 500. The next week [facility] and his FGB was in the mid-200's and he was started on Metformin 500mg BID. A month later his FBG was 180's and his A1C was 10.6%. At our initial visit in March 2016 I tested pt for FRANK and Anti-insulin Ab, both of which were negative, his insulin and C-peptide levels were normal. 
We switched him to Metformin XR 1000mg BID, because he was not tolerating the Metformin IR. At our last visit in October 2018 his A1C had increased from 7.4% to 10.6%. Pt reported that he had been eating a very high carb diet. We started him on Glipizide 5mg BID and continued the Metformin 1000mg BID. \"I have been eating slightly better, but still not good. We are are finally starting to settle into our new house and have been starting to actually make meals at home\". Pt is not checking his BGs. Pt is taking the Glipizide 5mg BID and Metformin 1000mg BID. His A1C today was 10.3%. Pt has lost an additional 3 pounds since our last visit. Today his weight was 188 pounds. Pt is eating 2-3 meals daily. He wakes around 7-8AM. He rarely eats breakfast and when he does it is typically a protein bar. He has lunch around 1PM, yesterday he had a turkey sandwich no side items and SF red bull. His has dinner between 6-10PM, last night he had pulled pork, green beans and unsweetened tea. He has not been snacking typically. His foster dog was adopted but he will be getting a puppy in a couple of weeks. He has not been working on his [employer]". No history of vascular disease. No history of retinopathy, neuropathy, or nephropathy. Pt has hx of UC but not symptoms since the age of 17. Pt has been diagnosed with SHARON but is not using CPAP. Last saw his eye doctor May 2018, \"everything looked good\". In January 2018 his LDL is 120 but . Pt is 34 y/o, his father has hx of CAD. We have discussed risks and most recent guidelines and research. Current Outpatient Medications Medication Sig  
 glucose blood VI test strips (RELION PRIME TEST STRIPS) strip Check sugar daily  metFORMIN ER (GLUCOPHAGE XR) 500 mg tablet TAKE TWO TABLETS BY MOUTH WITH BREAKFAST AND DINNER  
 glipiZIDE (GLUCOTROL) 5 mg tablet Take 1 Tab by mouth two (2) times a day.  cetirizine (ZYRTEC) 10 mg tablet Take 10-20 mg by mouth. No current facility-administered medications for this visit. No Known Allergies Review of Systems: - Eyes: no blurry vision or double vision - Cardiovascular: no chest pain - Respiratory: no shortness of breath - Musculoskeletal: no myalgias - Neurological: no numbness/tingling in extremities Physical Examination: 
Blood pressure 104/80, pulse 94, height 5' 9\" (1.753 m), weight 188 lb 6.4 oz (85.5 kg). - General: pleasant, no distress, good eye contact  
- Neck: no carotid bruits - Cardiovascular: regular, normal rate, nl s1 and s2, no m/r/g, 2+ DP pulses - Respiratory: clear bilaterally - Integumentary: no edema, no foot ulcers,  
- Psychiatric: normal mood and affect Diabetic foot exam:  
 
Left Foot: 
 Visual Exam: normal  
 Pulse DP: 2+ (normal) Filament test: normal sensation Vibratory sensation: normal 
   
Right Foot: 
 Visual Exam: normal  
 Pulse DP: 2+ (normal) Filament test: normal sensation Vibratory sensation: normal 
 
 
 
Data Reviewed:  
His A1C today was 10.3% Assessment/Plan: 1) DM > His A1C today was 10.3%. His BGs did not improve the the Glipizide. He previously tested negative for Abs, and his C-peptide was normal. He is a young man, not obese and has a hx of an auto-immune condition already. Will repeat the insulin and c-peptide levels. We discussed the mechanism of action of the GLP-1 agents and the risk vs benefits, including Nausea, pancreatitis and hypoglycemia. Pt given written information about Ozempic. His BP is at goal on no medications. His LDL in January 2018 was 120 and his . Pt is 32 y/o, his father has hx of CAD. We discussed risks and most recent guidelines and research. Will not start anti-lipid medications at this time, but will monitor his lipid periodically. RTC 3 months Pt voices understanding and agreement with the plan. Follow-up Disposition: 
Return in about 3 months (around 5/14/2019).

## 2019-02-14 NOTE — PATIENT INSTRUCTIONS
Ozempic Treats type 2 diabetes and helps with weight loss in certain patients. Also reduces the risk of heart attacks and strokes in patients with type 2 diabetes and heart or blood vessel disease. When This Medicine Should Not Be Used: This medicine is not right for everyone. Do not use it if you had an allergic reaction to liraglutide, or if you have multiple endocrine neoplasia syndrome type 2 (MEN 2) or if you or anyone in your family had medullary thyroid cancer. Tell your doctor if you are pregnant or have become pregnant while you are using this medicine. How to Use This Medicine:  
Injectable · Your doctor will prescribe your exact dose and tell you how often it should be given. This medicine is given as a shot under the skin of your stomach, thighs, or upper arms. · If you use insulin in addition to this medicine, do not mix them into the same syringe. You may give the shots in the same area (including your stomach), but do not give the shots right next to each other. · You may be taught how to give your medicine at home. Make sure you understand all instructions before giving yourself an injection. Do not use more medicine or use it more often than your doctor tells you to. · Check the liquid in the pen. It should be clear and colorless. Do not use it if it is cloudy, discolored, or has particles in it. · You will be shown the body areas where this shot can be given. Use a different body area each time you give yourself a shot. Keep track of where you give each shot to make sure you rotate body areas. · Use a new needle and syringe each time you inject your medicine. · Never share medicine pens with others under any circumstances. Sharing needles or pens can result in transmission of infection. · Drink extra fluids so you will urinate more often and help prevent kidney problems. · This medicine should come with a Medication Guide. Ask your pharmacist for a copy if you do not have one. · Missed dose: If you miss a dose of this medicine, use it as soon as you remember. Then take your next dose at your usual time. Never take extra medicine to make up for a missed dose. If you miss a dose for 3 days or more, call your doctor to talk about how to restart your treatment. · Store your new, unused medicine pen in its original carton in the refrigerator. Protect it from light. Do not freeze this medicine or use it if it has been frozen. You may store the opened medicine pen in the refrigerator or at room temperature for 30 days. Throw away your used pen after 30 days, even if it still has medicine in it. Always remove the needle from the pen before you store it. · Throw away used needles in a hard, closed container that the needles cannot poke through. Keep this container away from children and pets. Drugs and Foods to Avoid: Ask your doctor or pharmacist before using any other medicine, including over-the-counter medicines, vitamins, and herbal products. Warnings While Using This Medicine: · Tell your doctor if you are breastfeeding, or if you have kidney disease, liver disease, digestion problems (including gastroparesis), gallbladder disease, or a history of pancreas problems, depression, or angioedema (swelling of the arms, face, hands, mouth, or throat). · Do not use Saxenda® if you are also using Victoza®. They contain the same medicine. · This medicine may cause the following problems:  
¨ Increased risk for thyroid tumors ¨ Pancreatitis ¨ Low blood sugar ¨ Kidney problems ¨ Gallbladder problems, including gallstones ¨ Thoughts of hurting yourself Anali Risk) · Your doctor will do lab tests at regular visits to check on the effects of this medicine. Keep all appointments. · Keep all medicine out of the reach of children. Never share your medicine with anyone. Possible Side Effects While Using This Medicine: Call your doctor right away if you notice any of these side effects: · Allergic reaction: Itching or hives, swelling in your face or hands, swelling or tingling in your mouth or throat, chest tightness, trouble breathing · Change in how much or how often you urinate, painful or burning urination · Feeling sad or depressed, thoughts of suicide, unusual changes in mood or behavior · Shaking, trembling, sweating, fast or pounding heartbeat, fainting, hunger, confusion · Sudden and severe stomach pain, nausea, vomiting, fever, lightheadedness · Trouble breathing or swallowing, a lump in your neck, hoarseness when speaking · Yellow skin or eyes If you notice these less serious side effects, talk with your doctor: · Decreased appetite · Diarrhea, constipation, stomach upset · Dizziness · Headache · Redness, itching, swelling, or any changes in your skin where the shot was given If you notice other side effects that you think are caused by this medicine, tell your doctor. Call your doctor for medical advice about side effects. You may report side effects to FDA at 7-504-FDA-6999 © 2017 [facility] Information is for End User's use only and may not be sold, redistributed or otherwise used for commercial purposes. The above information is an  only. It is not intended as medical advice for individual conditions or treatments. Talk to your doctor, nurse or pharmacist before following any medical regimen to see if it is safe and effective for you.

## 2019-02-15 LAB
ALBUMIN SERPL-MCNC: 4.5 G/DL (ref 3.5–5.5)
ALBUMIN/GLOB SERPL: 1.5 {RATIO} (ref 1.2–2.2)
ALP SERPL-CCNC: 81 IU/L (ref 39–117)
ALT SERPL-CCNC: 23 IU/L (ref 0–44)
AST SERPL-CCNC: 15 IU/L (ref 0–40)
BILIRUB SERPL-MCNC: <0.2 MG/DL (ref 0–1.2)
BUN SERPL-MCNC: 13 MG/DL (ref 6–20)
BUN/CREAT SERPL: 19 (ref 9–20)
C PEPTIDE SERPL-MCNC: 2.2 NG/ML (ref 1.1–4.4)
CALCIUM SERPL-MCNC: 9.6 MG/DL (ref 8.7–10.2)
CHLORIDE SERPL-SCNC: 96 MMOL/L (ref 96–106)
CHOLEST SERPL-MCNC: 179 MG/DL (ref 100–199)
CO2 SERPL-SCNC: 23 MMOL/L (ref 20–29)
CREAT SERPL-MCNC: 0.7 MG/DL (ref 0.76–1.27)
GLOBULIN SER CALC-MCNC: 3.1 G/DL (ref 1.5–4.5)
GLUCOSE SERPL-MCNC: 243 MG/DL (ref 65–99)
HDLC SERPL-MCNC: 26 MG/DL
INSULIN SERPL-ACNC: 11.5 UIU/ML (ref 2.6–24.9)
INTERPRETATION, 910389: NORMAL
LDLC SERPL CALC-MCNC: 98 MG/DL (ref 0–99)
Lab: NORMAL
POTASSIUM SERPL-SCNC: 4 MMOL/L (ref 3.5–5.2)
PROT SERPL-MCNC: 7.6 G/DL (ref 6–8.5)
SODIUM SERPL-SCNC: 138 MMOL/L (ref 134–144)
TRIGL SERPL-MCNC: 273 MG/DL (ref 0–149)
VLDLC SERPL CALC-MCNC: 55 MG/DL (ref 5–40)

## 2019-02-18 RX ORDER — PEN NEEDLE, DIABETIC 31 GX3/16"
NEEDLE, DISPOSABLE MISCELLANEOUS
Qty: 100 PEN NEEDLE | Refills: 3 | Status: SHIPPED | OUTPATIENT
Start: 2019-02-18 | End: 2021-05-04 | Stop reason: SDUPTHER

## 2019-02-18 RX ORDER — INSULIN GLARGINE 100 [IU]/ML
INJECTION, SOLUTION SUBCUTANEOUS
Qty: 15 ML | Refills: 3 | Status: SHIPPED | OUTPATIENT
Start: 2019-02-18 | End: 2019-08-01 | Stop reason: SDUPTHER

## 2019-02-18 NOTE — PROGRESS NOTES
His insulin and C-peptide level are in the \"normal\" range, but I would expect them to be high with the high BG. Will start pt on Glargine 20 units daily.

## 2019-03-04 RX ORDER — GLIPIZIDE 5 MG/1
TABLET ORAL
Qty: 60 TAB | Refills: 2 | Status: SHIPPED | OUTPATIENT
Start: 2019-03-04 | End: 2019-05-05 | Stop reason: SDUPTHER

## 2019-05-06 RX ORDER — GLIPIZIDE 5 MG/1
TABLET ORAL
Qty: 60 TAB | Refills: 1 | Status: SHIPPED | OUTPATIENT
Start: 2019-05-06 | End: 2019-07-08 | Stop reason: SDUPTHER

## 2019-05-23 ENCOUNTER — OFFICE VISIT (OUTPATIENT)
Dept: ENDOCRINOLOGY | Age: 34
End: 2019-05-23

## 2019-05-23 VITALS
HEIGHT: 69 IN | HEART RATE: 107 BPM | SYSTOLIC BLOOD PRESSURE: 111 MMHG | BODY MASS INDEX: 30.16 KG/M2 | WEIGHT: 203.6 LBS | DIASTOLIC BLOOD PRESSURE: 79 MMHG

## 2019-05-23 DIAGNOSIS — E11.9 TYPE 2 DIABETES MELLITUS WITHOUT COMPLICATION, WITH LONG-TERM CURRENT USE OF INSULIN (HCC): Primary | ICD-10-CM

## 2019-05-23 DIAGNOSIS — Z79.4 TYPE 2 DIABETES MELLITUS WITHOUT COMPLICATION, WITH LONG-TERM CURRENT USE OF INSULIN (HCC): Primary | ICD-10-CM

## 2019-05-23 LAB — HBA1C MFR BLD HPLC: 7.8 %

## 2019-05-23 NOTE — PROGRESS NOTES
Chief Complaint   Patient presents with    Diabetes     pcp and pharmacy verified   Records since last visit reviewed  History of Present Illness: Dee Dee Noel. is a 29 y.o. male here for follow up of diabetes. Pt notes in January 2016 he was having issues of polyuria and polydipsia and was told by his friend, who is diabetic, was told he was probably diabetic as well. He notes that he checked his BG that day was 500. The next week he [facility] to [facility] and his FGB was in the mid-200's and he was started on Metformin 500mg BID. A month later his FBG was 180's and his A1C was 10.6%. At our initial visit in March 2016 I tested pt for FRANK and Anti-insulin Ab, both of which were negative, his insulin and C-peptide levels were normal.  We switched him to Metformin XR 1000mg BID, because he was not tolerating the Metformin IR. His BGs and A1C started to increase and at our last visit in February 2019, His A1C was upt to 10.3. I repeated his insulin and c-peptide levels. They were in a normal range, but in the face of a blood sugar of >200 they should have been high so I started pt on Basaglar 20 units daily. His A1C today was 7.8%. He denies any recent illnesses, injuries or hospitalizations. Pt is taking the Glipizide 5mg BID and Metformin XR 1000mg BID and Basaglar 20 units HS. Pt is eating 2-3 meals daily. He wakes around 7-8AM.  He rarely eats breakfast and when he does it is typically a protein bar. He has lunch around 1PM, yesterday he had a grilled chicken salad and diet soda and a \"SF energy drink\". His has dinner between 6-10PM, last night he had sausage, brussel sprouts and diet soda. He has not been snacking typically. Pt adopted a puppy. He has not been working on his [employer]". No history of vascular disease. No history of retinopathy, neuropathy, or nephropathy. Pt has hx of UC but not symptoms since the age of 17.  Pt has been diagnosed with SHARON but is not using CPAP. Last saw his eye doctor May 2018, \"everything looked good\". In January 2018 his LDL is 120 but . Pt is 32 y/o, his father has hx of CAD. We have discussed risks and most recent guidelines and research. Current Outpatient Medications   Medication Sig    glipiZIDE (GLUCOTROL) 5 mg tablet TAKE ONE TABLET BY MOUTH TWICE A DAY    insulin glargine (LANTUS,BASAGLAR) 100 unit/mL (3 mL) inpn 20 units daily (Patient taking differently: BASAGLAR   20 units daily)    Insulin Needles, Disposable, (KELSEY PEN NEEDLE) 32 gauge x 5/32\" ndle One shot daily    glucose blood VI test strips (RELION PRIME TEST STRIPS) strip Check sugar daily    metFORMIN ER (GLUCOPHAGE XR) 500 mg tablet TAKE TWO TABLETS BY MOUTH WITH BREAKFAST AND DINNER    cetirizine (ZYRTEC) 10 mg tablet Take 10-20 mg by mouth. No current facility-administered medications for this visit. No Known Allergies  Review of Systems:  - Eyes: no blurry vision or double vision  - Cardiovascular: no chest pain  - Respiratory: no shortness of breath  - Musculoskeletal: no myalgias  - Neurological: no numbness/tingling in extremities    Physical Examination:  Blood pressure 111/79, pulse (!) 107, height 5' 9\" (1.753 m), weight 203 lb 9.6 oz (92.4 kg). - General: pleasant, no distress, good eye contact   - Neck: no carotid bruits  - Cardiovascular: regular, normal rate, nl s1 and s2, no m/r/g, 2+ DP pulses   - Respiratory: clear bilaterally  - Integumentary: no edema, no foot ulcers,   - Psychiatric: normal mood and affect    Diabetic foot exam:     Left Foot:   Visual Exam: normal    Pulse DP: 2+ (normal)   Filament test: normal sensation    Vibratory sensation: normal      Right Foot:   Visual Exam: normal    Pulse DP: 2+ (normal)   Filament test: normal sensation    Vibratory sensation: normal        Data Reviewed:   His A1C today was 7.8%    Assessment/Plan:   1) DM > His A1C today was 7.8%.  His BGs has improved with the Basaglar 20 units daily. For now will continue the Glipizide 5mg BID, Metformin XR 1000mg BID and Lantus 20 units daily. He is a young man, not obese and has a hx of an auto-immune condition already (UC, for which he was on high doses of steroids for many years, before he was started on imunomodulators). His BP is at goal on no medications. His LDL in January 2018 was 120 and his . Pt is 34 y/o, his father has hx of CAD. We discussed risks and most recent guidelines and research. Will not start anti-lipid medications at this time, but will monitor his lipid periodically. RTC 3 months    Pt voices understanding and agreement with the plan. Follow-up and Dispositions    · Return in about 3 months (around 8/23/2019).

## 2019-05-24 LAB
ALBUMIN/CREAT UR: 4 MG/G CREAT (ref 0–30)
CREAT UR-MCNC: 167.5 MG/DL
MICROALBUMIN UR-MCNC: 6.7 UG/ML

## 2019-06-05 RX ORDER — METFORMIN HYDROCHLORIDE 500 MG/1
TABLET, EXTENDED RELEASE ORAL
Qty: 120 TAB | Refills: 3 | Status: SHIPPED | OUTPATIENT
Start: 2019-06-05 | End: 2019-08-01 | Stop reason: SDUPTHER

## 2019-08-01 ENCOUNTER — OFFICE VISIT (OUTPATIENT)
Dept: ENDOCRINOLOGY | Age: 34
End: 2019-08-01

## 2019-08-01 VITALS
HEIGHT: 69 IN | WEIGHT: 203.8 LBS | DIASTOLIC BLOOD PRESSURE: 75 MMHG | BODY MASS INDEX: 30.18 KG/M2 | HEART RATE: 86 BPM | SYSTOLIC BLOOD PRESSURE: 111 MMHG

## 2019-08-01 DIAGNOSIS — Z79.4 TYPE 2 DIABETES MELLITUS WITHOUT COMPLICATION, WITH LONG-TERM CURRENT USE OF INSULIN (HCC): Primary | ICD-10-CM

## 2019-08-01 DIAGNOSIS — E11.9 TYPE 2 DIABETES MELLITUS WITHOUT COMPLICATION, WITH LONG-TERM CURRENT USE OF INSULIN (HCC): Primary | ICD-10-CM

## 2019-08-01 LAB — HBA1C MFR BLD HPLC: 7.9 %

## 2019-08-01 RX ORDER — METFORMIN HYDROCHLORIDE 500 MG/1
TABLET, EXTENDED RELEASE ORAL
Qty: 120 TAB | Refills: 3 | Status: SHIPPED | OUTPATIENT
Start: 2019-08-01 | End: 2020-02-10

## 2019-08-01 RX ORDER — GLIPIZIDE 5 MG/1
TABLET ORAL
Qty: 60 TAB | Refills: 3 | Status: SHIPPED | OUTPATIENT
Start: 2019-08-01 | End: 2019-11-25 | Stop reason: ALTCHOICE

## 2019-08-01 RX ORDER — INSULIN GLARGINE 100 [IU]/ML
INJECTION, SOLUTION SUBCUTANEOUS
Qty: 15 ML | Refills: 3 | Status: SHIPPED | OUTPATIENT
Start: 2019-08-01 | End: 2020-02-18 | Stop reason: SDUPTHER

## 2019-08-01 NOTE — PROGRESS NOTES
Chief Complaint   Patient presents with    Diabetes     pcp and pharmacy verified, Eye exam: over a year   Records since last visit reviewed  History of Present Illness: Denise Abdi is a 29 y.o. male here for follow up of diabetes. Pt notes in January 2016 he was having issues of polyuria and polydipsia and was told by his friend, who is diabetic, was told he was probably diabetic as well. He notes that he checked his BG that day was 500. The next week he [facility] and his FGB was in the mid-200's and he was started on Metformin 500mg BID. A month later his FBG was 180's and his A1C was 10.6%. At our initial visit in March 2016 I tested pt for FRANK and Anti-insulin Ab, both of which were negative, his insulin and C-peptide levels were normal.  At our visit in February 2019 his BGs and A1C started to increase and at our last visit in February 2019, His A1C was upt to 10.3. I repeated his insulin and c-peptide levels. They were in a normal range, but in the face of a blood sugar of >200 they should have been high so I started pt on Basaglar 20 units daily. At our last visit in May 2019 his A1C had improved down from 10.3% to 7.8% on Glipizide 5mg BID and Metformin XR 1000mg BID and Basaglar 20 units HS. Pt was encouraged to continue this regimen and keep up the good work. His A1C today was 7.9%. Pt had an episode of taking his HS Basaglar and an hour later he had an episode of palpitations. He got up got water, because he was feeling dehydrated. He checked his BG and it was 54. He notes this only happened once and it occurred a week ago. He notes that the meal he had that night had no carbohydrates and he had been eating 40 or less carbs per day that week. He does not recall any increased physical activities. He checks his BGs HS since this episode. He notes his BGs have ranged in the 120-220's range.     Pt is taking the Glipizide 5mg BID and Metformin XR 1000mg BID and Basaglar 20 units HS. Pt is eating 2-3 meals daily. He wakes around 7-8AM.  He rarely eats breakfast and when he does it is typically a protein bar. He has lunch around Noon-1PM, yesterday he had a turkey sub and diet soda. His has dinner between 7PM, last night he had no carb [place] casserole\". He has not been snacking typically. Pt adopted a puppy. He has not been working on his [employer]". No history of vascular disease. No history of retinopathy, neuropathy, or nephropathy. Pt has hx of UC but not symptoms since the age of 17. Pt has been diagnosed with SHARON but is not using CPAP. Last saw his eye doctor May 2018, \"everything looked good\". In January 2018 his LDL is 120 but . Pt is 32 y/o, his father has hx of CAD. We have discussed risks and most recent guidelines and research. Current Outpatient Medications   Medication Sig    glipiZIDE (GLUCOTROL) 5 mg tablet TAKE ONE TABLET BY MOUTH TWICE A DAY    metFORMIN ER (GLUCOPHAGE XR) 500 mg tablet TAKE TWO TABLETS BY MOUTH TWICE A DAY (WITH BREAKFAST AND DINNER)    insulin glargine (LANTUS,BASAGLAR) 100 unit/mL (3 mL) inpn 20 units daily (Patient taking differently: BASAGLAR   20 units daily)    Insulin Needles, Disposable, (KELSEY PEN NEEDLE) 32 gauge x 5/32\" ndle One shot daily    glucose blood VI test strips (RELION PRIME TEST STRIPS) strip Check sugar daily    cetirizine (ZYRTEC) 10 mg tablet Take 10-20 mg by mouth. No current facility-administered medications for this visit. No Known Allergies  Review of Systems:  - Eyes: no blurry vision or double vision  - Cardiovascular: no chest pain  - Respiratory: no shortness of breath  - Musculoskeletal: no myalgias  - Neurological: no numbness/tingling in extremities    Physical Examination:  Blood pressure 111/75, pulse 86, height 5' 9\" (1.753 m), weight 203 lb 12.8 oz (92.4 kg).   - General: pleasant, no distress, good eye contact   - Neck: no carotid bruits  - Cardiovascular: regular, normal rate, nl s1 and s2, no m/r/g, 2+ DP pulses   - Respiratory: clear bilaterally  - Integumentary: no edema, no foot ulcers,   - Psychiatric: normal mood and affect    Diabetic foot exam:     Left Foot:   Visual Exam: normal    Pulse DP: 2+ (normal)   Filament test: normal sensation    Vibratory sensation: normal      Right Foot:   Visual Exam: normal    Pulse DP: 2+ (normal)   Filament test: normal sensation    Vibratory sensation: normal        Data Reviewed:   His A1C today was 7.9%    Assessment/Plan:   1) DM > His A1C today was 7.9%. Pt has not been checking his BGs regularly so I don't know how to adjust his regimen. He had a low BGs, but that was only once. Will have pt check his BGs 3 times per day and send me his readings in 2 weeks so we can make changes to his regimen based on how his sugars are running. His BP is at goal on no medications. His LDL in January 2018 was 120 and his . Pt is 32 y/o, his father has hx of CAD. We discussed risks and most recent guidelines and research. Will not start anti-lipid medications at this time, but will monitor his lipid periodically. RTC 3 months    Pt voices understanding and agreement with the plan. Follow-up and Dispositions    · Return in about 3 months (around 11/1/2019).

## 2019-08-23 DIAGNOSIS — Z79.4 TYPE 2 DIABETES MELLITUS WITHOUT COMPLICATION, WITH LONG-TERM CURRENT USE OF INSULIN (HCC): ICD-10-CM

## 2019-08-23 DIAGNOSIS — E11.9 TYPE 2 DIABETES MELLITUS WITHOUT COMPLICATION, WITH LONG-TERM CURRENT USE OF INSULIN (HCC): ICD-10-CM

## 2019-11-04 ENCOUNTER — OFFICE VISIT (OUTPATIENT)
Dept: ENDOCRINOLOGY | Age: 34
End: 2019-11-04

## 2019-11-04 VITALS
HEART RATE: 109 BPM | BODY MASS INDEX: 31.19 KG/M2 | WEIGHT: 210.6 LBS | HEIGHT: 69 IN | DIASTOLIC BLOOD PRESSURE: 82 MMHG | SYSTOLIC BLOOD PRESSURE: 120 MMHG

## 2019-11-04 DIAGNOSIS — Z79.4 TYPE 2 DIABETES MELLITUS WITHOUT COMPLICATION, WITH LONG-TERM CURRENT USE OF INSULIN (HCC): Primary | ICD-10-CM

## 2019-11-04 DIAGNOSIS — E11.9 TYPE 2 DIABETES MELLITUS WITHOUT COMPLICATION, WITH LONG-TERM CURRENT USE OF INSULIN (HCC): Primary | ICD-10-CM

## 2019-11-04 LAB — HBA1C MFR BLD HPLC: 8 %

## 2019-11-04 NOTE — PROGRESS NOTES
Chief Complaint   Patient presents with    Diabetes     PCP and Pharmacy verified   Records since last visit reviewed  History of Present Illness: Keesha Mims. is a 29 y.o. male here for follow up of diabetes. Pt notes in January 2016 he was having issues of polyuria and polydipsia and was told by his friend, who is diabetic, was told he was probably diabetic as well. He notes that he checked his BG that day was 500. The next week he [facility] and his FGB was in the mid-200's and he was started on Metformin 500mg BID. A month later his FBG was 180's and his A1C was 10.6%. At our initial visit in March 2016 I tested pt for FRANK and Anti-insulin Ab, both of which were negative, his insulin and C-peptide levels were normal.  At our visit in February 2019 his BGs and A1C started to increase and at our last visit in February 2019, His A1C was upt to 10.3. I repeated his insulin and c-peptide levels. They were in a normal range, but in the face of a blood sugar of >200 they should have been high so I started pt on Basaglar 20 units daily. Pt has changed jobs and he notes he has been under a lot of stress. He is now working or the [employer]. His A1C today was 8.0%. He does not check his BGs. He notes that his glucometer broke. Pt is taking the Glipizide 5mg BID and Metformin XR 1000mg BID and Basaglar 20 units HS. Pt is eating 2-3 meals daily. He wakes around 7-8AM.  He rarely eats breakfast and when he does it is typically a protein bar (8 carbs). He has lunch around Noon-1PM, yesterday he did not eat lunch. His has dinner between 7PM, last night he had chicken wings, cheese curds and diet soda. He has not been snacking typically. Pt adopted a puppy \"he is gigantic and he is good\". He has not been working on his [employer]". No history of vascular disease. No history of retinopathy, neuropathy, or nephropathy.      Pt has hx of UC but not symptoms since the age of 17. Pt has been diagnosed with SHARON but is not using CPAP. Last saw his eye doctor May 2018, \"everything looked good\". His LDL in February 2019 was 98 and his . Pt is 28 y/o, his father has hx of CAD. We have discussed risks and most recent guidelines and research. Current Outpatient Medications   Medication Sig    glucose blood VI test strips (ONETOUCH VERIO) strip Check sugars twice daily    insulin glargine (LANTUS,BASAGLAR) 100 unit/mL (3 mL) inpn BASAGLAR   20 units daily    glipiZIDE (GLUCOTROL) 5 mg tablet TAKE ONE TABLET BY MOUTH TWICE A DAY    metFORMIN ER (GLUCOPHAGE XR) 500 mg tablet TAKE TWO TABLETS BY MOUTH TWICE A DAY (WITH BREAKFAST AND DINNER)    Insulin Needles, Disposable, (KELSEY PEN NEEDLE) 32 gauge x 5/32\" ndle One shot daily    cetirizine (ZYRTEC) 10 mg tablet Take 10-20 mg by mouth. No current facility-administered medications for this visit. No Known Allergies  Review of Systems:  - Eyes: no blurry vision or double vision  - Cardiovascular: no chest pain  - Respiratory: no shortness of breath  - Musculoskeletal: no myalgias  - Neurological: no numbness/tingling in extremities    Physical Examination:  Blood pressure 120/82, pulse (!) 109, height 5' 9\" (1.753 m), weight 210 lb 9.6 oz (95.5 kg). - General: pleasant, no distress, good eye contact   - Neck: no carotid bruits  - Cardiovascular: regular, normal rate, nl s1 and s2, no m/r/g, 2+ DP pulses   - Respiratory: clear bilaterally  - Integumentary: no edema, no foot ulcers,   - Psychiatric: normal mood and affect    Diabetic foot exam:     Left Foot:   Visual Exam: normal    Pulse DP: 2+ (normal)   Filament test: normal sensation    Vibratory sensation: normal      Right Foot:   Visual Exam: normal    Pulse DP: 2+ (normal)   Filament test: normal sensation    Vibratory sensation: normal        Data Reviewed:   His A1C today was 8.0%    Assessment/Plan:   1) DM > His A1C today was 8.0%.  Pt to work on starting a [employer]. I gave pt a glucometer and instructed him to check his BGs twice per day. Pt is not having issues of hypoglycemia. Pt to send me BG logs in 3 weeks. His BP is at goal on no medications. His LDL in February 2019 was 98 and his . Pt is 28 y/o, his father has hx of CAD. We discussed risks and most recent guidelines and research. Will not start anti-lipid medications at this time, but will monitor his lipid periodically. RTC 3 months    Pt voices understanding and agreement with the plan. Follow-up and Dispositions    · Return in about 3 months (around 2/4/2020).

## 2019-11-08 RX ORDER — BLOOD-GLUCOSE METER
EACH MISCELLANEOUS
Qty: 1 EACH | Refills: 0 | Status: SHIPPED | OUTPATIENT
Start: 2019-11-08 | End: 2020-03-19 | Stop reason: ALTCHOICE

## 2019-11-12 ENCOUNTER — TELEPHONE (OUTPATIENT)
Dept: ENDOCRINOLOGY | Age: 34
End: 2019-11-12

## 2019-11-12 NOTE — TELEPHONE ENCOUNTER
Steve Coffman,      Patient was calling you to give an update on his blood sugar readings.   He stated that he has decreased his carbs intake however his sugar is still not going down,  He stated the lowest it has been fasting is 145 and the highest it has been is 185

## 2019-11-14 NOTE — TELEPHONE ENCOUNTER
Spoke with pt regarding insulin resistance and decreased pancreatic function. We have previously tested him for anti-insulin and anti-pancreactic Ab and they were negative. His c-peptide has been in then 2-3 range, though this has been in the face of Bgs in the 200's. I explained that my concern is that his pancreatic function is declining. Pt to continue the Metformin 1000mg BID to help with insulin resistance and increase his Glipizide to 10mg BID and continue the Basaglar 20 units daily to see if his BG improve over the next 2-3 weeks, if now we may need to discuss the use of prandial insulin. Pt voices understanding and agreement with the plan.

## 2019-11-25 RX ORDER — GLIPIZIDE 10 MG/1
10 TABLET ORAL 2 TIMES DAILY
Qty: 60 TAB | Refills: 0 | Status: SHIPPED | OUTPATIENT
Start: 2019-11-25 | End: 2019-12-30

## 2019-12-16 ENCOUNTER — OFFICE VISIT (OUTPATIENT)
Dept: PRIMARY CARE CLINIC | Age: 34
End: 2019-12-16

## 2019-12-16 VITALS
DIASTOLIC BLOOD PRESSURE: 87 MMHG | HEART RATE: 87 BPM | BODY MASS INDEX: 31.25 KG/M2 | SYSTOLIC BLOOD PRESSURE: 127 MMHG | TEMPERATURE: 97.9 F | HEIGHT: 69 IN | OXYGEN SATURATION: 95 % | WEIGHT: 211 LBS | RESPIRATION RATE: 16 BRPM

## 2019-12-16 DIAGNOSIS — L50.9 HIVES: Primary | ICD-10-CM

## 2019-12-16 RX ORDER — CETIRIZINE HYDROCHLORIDE 5 MG/1
TABLET ORAL
COMMUNITY
End: 2020-03-19 | Stop reason: ALTCHOICE

## 2019-12-16 NOTE — PROGRESS NOTES
Brooklyn Nguyen is a 36 y.o. male    Room:4    Chief Complaint   Patient presents with    Dizziness     Pt States \" i just felt off at work and dizzy checked blood sugar and it was high at 181, and took temperature and it was around 99 just wants to check to make nothing is going on, and have a question about hives if i dont take zyrtec in the morning i will have hives by the evening heard that a tick bite can cause this\". Visit Vitals  /87 (BP 1 Location: Left arm, BP Patient Position: Sitting)   Pulse 87   Temp 97.9 °F (36.6 °C) (Oral)   Resp 16   Ht 5' 9\" (1.753 m)   Wt 211 lb (95.7 kg)   SpO2 95%   BMI 31.16 kg/m²       Pain Scale: 0 - No pain/10    1. Have you been to the ER, urgent care clinic since your last visit? Hospitalized since your last visit? No    2. Have you seen or consulted any other health care providers outside of the [address] [place] [address] since your last visit? Include any pap smears or colon screening.  No

## 2019-12-16 NOTE — PROGRESS NOTES
No chief complaint on file. he is a 36y.o. year old male who presents for evalution. He complains of some dizziness x 1 earlier and requests a blood sugar check. It was 165 non-fasting. He aslo is concerned about having hives over his body off and on for over 1 year, he has been taking zyrtec and if he forgets to take it he gets hives. He has not seen an allergist. He denies any cold symptoms, fever, nausea or vomiting. He denies weakness. Reviewed PmHx, RxHx, FmHx, SocHx, AllgHx and updated and dated in the chart. Review of Systems - negative except as listed above in the HPI    Objective: There were no vitals filed for this visit. Current Outpatient Medications   Medication Sig    cetirizine (ZYRTEC) 5 mg tablet Take  by mouth.  glipiZIDE (GLUCOTROL) 10 mg tablet Take 1 Tab by mouth two (2) times a day. (new dosage)    Blood-Glucose Meter (ACCU-CHEK SARA PLUS METER) misc Check blood sugar twice a day. (replaces Onetouch Verio)    glucose blood VI test strips (ACCU-CHEK SARA PLUS TEST STRP) strip Check blood sugar twice a day. (replaces Onetouch Verio)    Blood Glucose Control High&Low (ACCU-CHEK SARA CONTROL SOLN) soln Use when changing lot numbers on strips    glucose blood VI test strips (ONETOUCH VERIO) strip Check sugars twice daily    insulin glargine (LANTUS,BASAGLAR) 100 unit/mL (3 mL) inpn BASAGLAR   20 units daily    metFORMIN ER (GLUCOPHAGE XR) 500 mg tablet TAKE TWO TABLETS BY MOUTH TWICE A DAY (WITH BREAKFAST AND DINNER)    Insulin Needles, Disposable, (KELSEY PEN NEEDLE) 32 gauge x 5/32\" ndle One shot daily    cetirizine (ZYRTEC) 10 mg tablet Take 10-20 mg by mouth. No current facility-administered medications for this visit.         Physical Examination: General appearance - alert, well appearing, and in no distress  Mental status - alert, oriented to person, place, and time  Eyes - pupils equal and reactive, extraocular eye movements intact  Ears - bilateral TM's and external ear canals normal  Nose - normal and patent, no erythema, discharge or polyps  Mouth - mucous membranes moist, pharynx normal without lesions  Neck - supple, no significant adenopathy  Lymphatics - no palpable lymphadenopathy, no hepatosplenomegaly  Chest - clear to auscultation, no wheezes, rales or rhonchi, symmetric air entry  Heart - normal rate, regular rhythm, normal S1, S2, no murmurs, rubs, clicks or gallops  Abdomen - soft, nontender, nondistended, no masses or organomegaly  Back exam - full range of motion, no tenderness, palpable spasm or pain on motion  Neurological - alert, oriented, normal speech, no focal findings or movement disorder noted  Musculoskeletal - no joint tenderness, deformity or swelling  Extremities - peripheral pulses normal, no pedal edema, no clubbing or cyanosis  Skin - normal coloration and turgor, no rashes, no suspicious skin lesions noted      Assessment/ Plan:   Diagnoses and all orders for this visit:    1. Hives  -     REFERRAL TO PRIMARY CARE       Follow-up and Dispositions    · Return if symptoms worsen or fail to improve. I have discussed the diagnosis with the patient and the intended plan as seen in the above orders. The patient has received an after-visit summary and questions were answered concerning future plans. Pt conveyed understanding of plan.     Medication Side Effects and Warnings were discussed with patient      Newton Brower NP

## 2019-12-16 NOTE — PATIENT INSTRUCTIONS
Chronic Hives: Care Instructions Your Care Instructions Chronic hives are long-lasting raised, red, and itchy patches of skin called wheals or welts. This condition is also called chronic urticaria. Hives usually have red borders and pale centers. They range in size from ¼ inch to 3 inches or more across. They may seem to move from place to place on the skin. Several hives may join to form a large area of raised, red skin. When hives and swelling last more than 6 weeks even with treatment, they are called chronic. A single spot of hives may last less than 36 hours, but the problem may come and go for weeks or months. In most people, the problem often lasts less than 1 year and almost always goes away within 5 years. Hives may occur with swelling under the skin (called angioedema). But you may have swelling without hives. Swelling may hurt a bit, but it does not usually itch like hives. It can be dangerous if severe swelling affects your throat, but this is very rare. You cannot spread hives to other people. Follow-up care is a key part of your treatment and safety. Be sure to make and go to all appointments, and call your doctor if you are having problems. It's also a good idea to know your test results and keep a list of the medicines you take. How can you care for yourself at home? · Avoid whatever you think may have caused your hives, such as a certain food or medicine. But you may not know the cause. · Put a cool, wet towel on the area to relieve itching. · Your doctor may suggest an over-the-counter antihistamine, such as cetirizine (Zyrtec), diphenhydramine (Benadryl), or loratadine (Claritin), to help control the hives and swelling. Read and follow all instructions on the label. These medicines can make you feel sleepy. Do not drive while using them. · Your doctor may prescribe a shot of epinephrine to carry with you in case you have a severe reaction.  Learn how to give yourself the shot, and keep it with you at all times. Make sure it has not . · If your doctor prescribes another medicine, take it exactly as directed. When should you call for help? Give an epinephrine shot if: 
  · You think you are having a severe allergic reaction.  
 After giving an epinephrine shot call 911, even if you feel better. 
 Call 911 if: 
  · You have symptoms of a severe allergic reaction. These may include: 
? Sudden raised, red areas (hives) all over your body. ? Swelling of the throat, mouth, lips, or tongue. ? Trouble breathing. ? Passing out (losing consciousness). Or you may feel very lightheaded or suddenly feel weak, confused, or restless.  
  · You have been given an epinephrine shot, even if you feel better.  
 Call your doctor now or seek immediate medical care if: 
  · Your hives get worse.  
 Watch closely for changes in your health, and be sure to contact your doctor if: 
  · You do not get better as expected. Where can you learn more? Go to http://joel-emil.info/. Enter W006 in the search box to learn more about \"Chronic Hives: Care Instructions. \" Current as of: 2019 Content Version: 12.2 © 6701-6607 [employer]. Care instructions adapted under license by [employer] (which disclaims liability or warranty for this information). If you have questions about a medical condition or this instruction, always ask your healthcare professional. [place] [address] any warranty or liability for your use of this information.

## 2020-02-04 DIAGNOSIS — E11.9 TYPE 2 DIABETES MELLITUS WITHOUT COMPLICATION, WITH LONG-TERM CURRENT USE OF INSULIN (HCC): ICD-10-CM

## 2020-02-04 DIAGNOSIS — Z79.4 TYPE 2 DIABETES MELLITUS WITHOUT COMPLICATION, WITH LONG-TERM CURRENT USE OF INSULIN (HCC): ICD-10-CM

## 2020-02-10 RX ORDER — METFORMIN HYDROCHLORIDE 500 MG/1
TABLET, EXTENDED RELEASE ORAL
Qty: 120 TAB | Refills: 2 | Status: SHIPPED | OUTPATIENT
Start: 2020-02-10 | End: 2020-02-18 | Stop reason: SDUPTHER

## 2020-02-14 LAB
ALBUMIN SERPL-MCNC: 4.7 G/DL (ref 4–5)
ALBUMIN/CREAT UR: 8 MG/G CREAT (ref 0–29)
ALBUMIN/GLOB SERPL: 1.6 {RATIO} (ref 1.2–2.2)
ALP SERPL-CCNC: 76 IU/L (ref 39–117)
ALT SERPL-CCNC: 34 IU/L (ref 0–44)
AST SERPL-CCNC: 23 IU/L (ref 0–40)
BILIRUB SERPL-MCNC: 0.4 MG/DL (ref 0–1.2)
BUN SERPL-MCNC: 11 MG/DL (ref 6–20)
BUN/CREAT SERPL: 13 (ref 9–20)
CALCIUM SERPL-MCNC: 9.7 MG/DL (ref 8.7–10.2)
CHLORIDE SERPL-SCNC: 100 MMOL/L (ref 96–106)
CHOLEST SERPL-MCNC: 198 MG/DL (ref 100–199)
CO2 SERPL-SCNC: 23 MMOL/L (ref 20–29)
CREAT SERPL-MCNC: 0.83 MG/DL (ref 0.76–1.27)
CREAT UR-MCNC: 162.4 MG/DL
EST. AVERAGE GLUCOSE BLD GHB EST-MCNC: 148 MG/DL
GLOBULIN SER CALC-MCNC: 2.9 G/DL (ref 1.5–4.5)
GLUCOSE SERPL-MCNC: 148 MG/DL (ref 65–99)
HBA1C MFR BLD: 6.8 % (ref 4.8–5.6)
HDLC SERPL-MCNC: 34 MG/DL
INTERPRETATION, 910389: NORMAL
LDLC SERPL CALC-MCNC: 138 MG/DL (ref 0–99)
Lab: NORMAL
MICROALBUMIN UR-MCNC: 12.3 UG/ML
POTASSIUM SERPL-SCNC: 4.2 MMOL/L (ref 3.5–5.2)
PROT SERPL-MCNC: 7.6 G/DL (ref 6–8.5)
SODIUM SERPL-SCNC: 142 MMOL/L (ref 134–144)
TRIGL SERPL-MCNC: 128 MG/DL (ref 0–149)
VLDLC SERPL CALC-MCNC: 26 MG/DL (ref 5–40)

## 2020-02-18 ENCOUNTER — OFFICE VISIT (OUTPATIENT)
Dept: ENDOCRINOLOGY | Age: 35
End: 2020-02-18

## 2020-02-18 VITALS
WEIGHT: 211.2 LBS | HEIGHT: 69 IN | SYSTOLIC BLOOD PRESSURE: 116 MMHG | HEART RATE: 96 BPM | DIASTOLIC BLOOD PRESSURE: 86 MMHG | BODY MASS INDEX: 31.28 KG/M2

## 2020-02-18 DIAGNOSIS — E11.9 TYPE 2 DIABETES MELLITUS WITHOUT COMPLICATION, WITH LONG-TERM CURRENT USE OF INSULIN (HCC): Primary | ICD-10-CM

## 2020-02-18 DIAGNOSIS — Z79.4 TYPE 2 DIABETES MELLITUS WITHOUT COMPLICATION, WITH LONG-TERM CURRENT USE OF INSULIN (HCC): Primary | ICD-10-CM

## 2020-02-18 RX ORDER — METFORMIN HYDROCHLORIDE 500 MG/1
TABLET, EXTENDED RELEASE ORAL
Qty: 360 TAB | Refills: 3 | Status: SHIPPED | OUTPATIENT
Start: 2020-02-18 | End: 2021-02-20 | Stop reason: SDUPTHER

## 2020-02-18 RX ORDER — INSULIN GLARGINE 100 [IU]/ML
INJECTION, SOLUTION SUBCUTANEOUS
Qty: 30 ML | Refills: 2 | Status: SHIPPED | OUTPATIENT
Start: 2020-02-18 | End: 2021-03-22

## 2020-02-18 RX ORDER — GLIPIZIDE 10 MG/1
TABLET ORAL
Qty: 180 TAB | Refills: 3 | Status: SHIPPED | OUTPATIENT
Start: 2020-02-18 | End: 2021-02-20 | Stop reason: SDUPTHER

## 2020-02-18 NOTE — PROGRESS NOTES
Chief Complaint   Patient presents with    Diabetes     pcp and pharmacy verified   Records since last visit reviewed  History of Present Illness: Yasmany Vivar is a 36 y.o. male here for follow up of diabetes. Pt notes in January 2016 he was having issues of polyuria and polydipsia and was told by his friend, who is diabetic, was told he was probably diabetic as well. He notes that he checked his BG that day was 500. The next week [facility] and his FGB was in the mid-200's and he was started on Metformin 500mg BID. A month later his FBG was 180's and his A1C was 10.6%. At our initial visit in March 2016 I tested pt for FRANK and Anti-insulin Ab, both of which were negative, his insulin and C-peptide levels were normal.  At our visit in February 2019 his BGs and A1C started to increase and at our last visit in February 2019, His A1C was upt to 10.3. I repeated his insulin and c-peptide levels. They were in a normal range, but in the face of a blood sugar of >200 they should have been high so I started pt on Basaglar 20 units daily. His A1C last week declined from 8.0% down to 6.8%. Pt notes his diet has been \"better\". \"I have discovered the sadness of plenty of low carb foods which have been made from protein powders\". He is cutting out the breaded and fried foods. Pt is checking his BGs daily. His BGs have been in the 's range. Pt is taking the Glipizide 10mg BID and Metformin XR 1000mg BID and Basaglar 20 units HS. Pt is eating 2-3 meals daily. He wakes around 7-8AM.  He rarely eats breakfast and when he does it is typically a protein bar (8 carbs). He has lunch around Noon-1PM, yesterday he did not eat lunch. He will typically have a salad or a burrito bowl at [facility]. His has dinner between 7PM, last night he had a burrito bowl with no beans or rice. He has not been snacking typically. Pt adopted a puppy \"he is gigantic and he is good\".   He has not been working on his [employer]". No history of vascular disease. No history of retinopathy, neuropathy, or nephropathy. Pt has hx of UC but not symptoms since the age of 17. Pt has been diagnosed with SHARON but is not using CPAP. Last saw his eye doctor May 2018, \"everything looked good\". He will be seeing his eye doctor today. His LDL in February 2012 was 138 and his . Pt is 30 y/o, his father has hx of CAD. We have discussed risks and most recent guidelines and research. Current Outpatient Medications   Medication Sig    metFORMIN ER (GLUCOPHAGE XR) 500 mg tablet TAKE TWO TABLETS BY MOUTH TWICE A DAY WITH BREAKFAST AND DINNER    glipiZIDE (GLUCOTROL) 10 mg tablet TAKE ONE TABLET BY MOUTH TWICE A DAY    glucose blood VI test strips (ACCU-CHEK SARA PLUS TEST STRP) strip Check blood sugar twice a day. (replaces Onetouch Verio)    insulin glargine (LANTUS,BASAGLAR) 100 unit/mL (3 mL) inpn BASAGLAR   20 units daily    Insulin Needles, Disposable, (KELSEY PEN NEEDLE) 32 gauge x 5/32\" ndle One shot daily    cetirizine (ZYRTEC) 10 mg tablet Take 10-20 mg by mouth.  cetirizine (ZYRTEC) 5 mg tablet Take  by mouth.  Blood-Glucose Meter (ACCU-CHEK SARA PLUS METER) misc Check blood sugar twice a day. (replaces Onetouch Verio)    Blood Glucose Control High&Low (ACCU-CHEK SARA CONTROL SOLN) soln Use when changing lot numbers on strips    glucose blood VI test strips (ONETOUCH VERIO) strip Check sugars twice daily     No current facility-administered medications for this visit. No Known Allergies  Review of Systems:  - Eyes: no blurry vision or double vision  - Cardiovascular: no chest pain  - Respiratory: no shortness of breath  - Musculoskeletal: no myalgias  - Neurological: no numbness/tingling in extremities    Physical Examination:  Blood pressure 116/86, pulse 96, height 5' 9\" (1.753 m), weight 211 lb 3.2 oz (95.8 kg).   - General: pleasant, no distress, good eye contact   - Neck: no carotid bruits  - Cardiovascular: regular, normal rate, nl s1 and s2, no m/r/g, 2+ DP pulses   - Respiratory: clear bilaterally  - Integumentary: no edema, no foot ulcers,   - Psychiatric: normal mood and affect    Diabetic foot exam:     Left Foot:   Visual Exam: normal    Pulse DP: 2+ (normal)   Filament test: normal sensation    Vibratory sensation: normal      Right Foot:   Visual Exam: normal    Pulse DP: 2+ (normal)   Filament test: normal sensation    Vibratory sensation: normal        Data Reviewed:   Component      Latest Ref Rng & Units 2/13/2020 2/13/2020 2/13/2020 2/13/2020           4:50 PM  4:50 PM  4:50 PM  4:50 PM   Glucose      65 - 99 mg/dL  148 (H)     BUN      6 - 20 mg/dL  11     Creatinine      0.76 - 1.27 mg/dL  0.83     GFR est non-AA      >59 mL/min/1.73  116     GFR est AA      >59 mL/min/1.73  134     BUN/Creatinine ratio      9 - 20  13     Sodium      134 - 144 mmol/L  142     Potassium      3.5 - 5.2 mmol/L  4.2     Chloride      96 - 106 mmol/L  100     CO2      20 - 29 mmol/L  23     Calcium      8.7 - 10.2 mg/dL  9.7     Protein, total      6.0 - 8.5 g/dL  7.6     Albumin      4.0 - 5.0 g/dL  4.7     GLOBULIN, TOTAL      1.5 - 4.5 g/dL  2.9     A-G Ratio      1.2 - 2.2  1.6     Bilirubin, total      0.0 - 1.2 mg/dL  0.4     Alk. phosphatase      39 - 117 IU/L  76     AST      0 - 40 IU/L  23     ALT (SGPT)      0 - 44 IU/L  34     Cholesterol, total      100 - 199 mg/dL   198    Triglyceride      0 - 149 mg/dL   128    HDL Cholesterol      >39 mg/dL   34 (L)    VLDL, calculated      5 - 40 mg/dL   26    LDL, calculated      0 - 99 mg/dL   138 (H)    Creatinine, urine      Not Estab. mg/dL 162.4      Microalbumin, urine      Not Estab. ug/mL 12.3      Microalbumin/Creat.  Ratio      0 - 29 mg/g creat 8      Hemoglobin A1c, (calculated)      4.8 - 5.6 %    6.8 (H)   Estimated average glucose      mg/dL    148       Assessment/Plan:   1) DM > His A1C last week improved from 8.0% down to 6.8%. Pt encouraged to keep up the good work and continue the Glipizide 10mg BID, Metformin XR 1000mg BID and Basaglar 20 units HS. We have discussed his insulin resistance and decreased pancreatic function. We have previously tested him for anti-insulin and anti-pancreactic Ab and they were negative. His c-peptide has been in then 2-3 range, though this has been in the face of Bgs in the 200's. With the Keto diet his saturated fat intake was much higher. We discussed this in relation to his LDL. His BP is at goal on no medications. Pt is 37 y/o, his father has hx of CAD. We discussed risks and most recent guidelines and research. Will not start anti-lipid medications at this time, but will monitor his lipid periodically. RTC 3 months    Pt voices understanding and agreement with the plan.

## 2020-03-19 ENCOUNTER — OFFICE VISIT (OUTPATIENT)
Dept: INTERNAL MEDICINE CLINIC | Age: 35
End: 2020-03-19

## 2020-03-19 VITALS
RESPIRATION RATE: 18 BRPM | HEART RATE: 99 BPM | DIASTOLIC BLOOD PRESSURE: 75 MMHG | SYSTOLIC BLOOD PRESSURE: 123 MMHG | BODY MASS INDEX: 30.36 KG/M2 | TEMPERATURE: 98.1 F | HEIGHT: 69 IN | WEIGHT: 205 LBS | OXYGEN SATURATION: 99 %

## 2020-03-19 DIAGNOSIS — B35.1 NAIL FUNGUS: ICD-10-CM

## 2020-03-19 DIAGNOSIS — Z87.19 HX OF ULCERATIVE COLITIS: Primary | ICD-10-CM

## 2020-03-19 DIAGNOSIS — L50.1 IDIOPATHIC URTICARIA: ICD-10-CM

## 2020-03-19 DIAGNOSIS — Z79.4 CONTROLLED TYPE 2 DIABETES MELLITUS WITHOUT COMPLICATION, WITH LONG-TERM CURRENT USE OF INSULIN (HCC): ICD-10-CM

## 2020-03-19 DIAGNOSIS — E11.9 CONTROLLED TYPE 2 DIABETES MELLITUS WITHOUT COMPLICATION, WITH LONG-TERM CURRENT USE OF INSULIN (HCC): ICD-10-CM

## 2020-03-19 RX ORDER — CICLOPIROX 80 MG/ML
1 SOLUTION TOPICAL
Qty: 1 BOTTLE | Refills: 2 | Status: SHIPPED | OUTPATIENT
Start: 2020-03-19 | End: 2020-06-17

## 2020-03-19 NOTE — PROGRESS NOTES
Reviewed record in preparation for visit and have obtained necessary documentation. Identified pt with two pt identifiers(name and ). Chief Complaint   Patient presents with    Establish Care    Hives    Toe Injury       Health Maintenance Due   Topic Date Due    Pneumococcal Vaccine (1 of  - PPSV23) 1992    DTaP/Tdap/Td  (1 - Tdap) 2007       Mr. Regan has a reminder for a \"due or due soon\" health maintenance. I have asked that he discuss this further with his primary care provider for follow-up on this health maintenance. Coordination of Care Questionnaire:  :     1) Have you been to an emergency room, urgent care clinic since your last visit? no   Hospitalized since your last visit? no             2) Have you seen or consulted any other health care providers outside of [address] [place] [address] since your last visit? no  (Include any pap smears or colon screenings in this section.)    3) In the event something were to happen to you and you were unable to speak on your behalf, do you have an Advance Directive/ Living Will in place stating your wishes? NO    Do you have an Advance Directive on file? no    4) Are you interested in receiving information on Advance Directives? NO    Patient is accompanied by self I have received verbal consent from [facility]. to discuss any/all medical information while they are present in the room.

## 2020-03-19 NOTE — PROGRESS NOTES
Mr. Valeria Summers. is a new patient who is here to establish care. CC:  Establish Care; Hives; and Toe Injury       HPI:    37 yo male presenting to establish care  HA1C is 6.8   Diet is nearly no carb  Type 2 diabetes   Takes 20 units of insulin  1000mg BID of metformin and glipizide 10mg BID  No family hx of diabetes   Diagnosed at age 13    Has chronic hives. Has idiopathic urticaria and usually in upper arms and legs and at times in legs and chest   no hx of throat closure, nothing respiratory  No swelling in extremities  no know triggers  Present for years    As a child diagnosed in UC and after taking azathioprine and went away. Last colonoscopy in 25s   Does not exercise  Works for [employer]    Half sister in her 46s   No children  Non smoker /no drugs      Had a pilonidal cyst removed 13 years ago    Review of systems:  Constitutional: negative for fever, chills, weight loss, night sweats   Eyes : negative for vision changes, eye pain and discharge  Nose and Throat: negative for tinnitus, sore throat   Cardiovascular: negative for chest pain, palpitations and shortness of breath  Respiratory: negative for shortness of breath, cough and wheezing   Gastroinstestinal: negative for abdominal pain, nausea, vomiting, diarrhea, constipation, and blood in the stool  Musculoskeletal: negative for back ache and joint ache   Genitourinary: negative for dysuria, nocturia, polyuria and hematuria   Neurologic: Negative for focal weakness, numbness or incoordination  Skin: chronic hives  Concerned for possible toe nail fungus on big toe nail right  Hematologic: negative for easy bruising      Past Medical History:   Diagnosis Date    Diabetes ([facility] [address]     Gastrointestinal disorder     ulcerative colitis ?     Sleep disorder     apnea        Past Surgical History:   Procedure Laterality Date    HX OTHER SURGICAL      pylonidal cyst       No Known Allergies    Current Outpatient Medications on File Prior to Visit   Medication Sig Dispense Refill    metFORMIN ER (GLUCOPHAGE XR) 500 mg tablet TAKE TWO TABLETS BY MOUTH TWICE A DAY WITH BREAKFAST AND DINNER 360 Tab 3    insulin glargine (LANTUS,BASAGLAR) 100 unit/mL (3 mL) inpn BASAGLAR   20 units daily 30 mL 2    glipiZIDE (GLUCOTROL) 10 mg tablet TAKE ONE TABLET BY MOUTH TWICE A  Tab 3    glucose blood VI test strips (ACCU-CHEK SARA PLUS TEST STRP) strip Check blood sugar twice a day. (replaces Onetouch Verio) 100 Strip 11    Insulin Needles, Disposable, (KELSEY PEN NEEDLE) 32 gauge x 5/32\" ndle One shot daily 100 Pen Needle 3    cetirizine (ZYRTEC) 10 mg tablet Take 10-20 mg by mouth. No current facility-administered medications on file prior to visit. family history includes Heart Attack in his father and paternal grandfather; Hypertension in his father.     Social History     Socioeconomic History    Marital status: SINGLE     Spouse name: Not on file    Number of children: Not on file    Years of education: Not on file    Highest education level: Not on file   Occupational History    Not on file   Social Needs    Financial resource strain: Not on file    Food insecurity     Worry: Not on file     Inability: Not on file    Transportation needs     Medical: Not on file     Non-medical: Not on file   Tobacco Use    Smoking status: Never Smoker    Smokeless tobacco: Never Used   Substance and Sexual Activity    Alcohol use: No     Alcohol/week: 0.0 standard drinks     Comment: rare    Drug use: No    Sexual activity: Yes   Lifestyle    Physical activity     Days per week: Not on file     Minutes per session: Not on file    Stress: Not on file   Relationships    Social connections     Talks on phone: Not on file     Gets together: Not on file     Attends [employer] service: Not on file     Active member of club or organization: Not on file     Attends meetings of clubs or organizations: Not on file     Relationship status: Not on file    Intimate partner violence     Fear of current or ex partner: Not on file     Emotionally abused: Not on file     Physically abused: Not on file     Forced sexual activity: Not on file   Other Topics Concern    Not on file   Social History Narrative    Not on file       Visit Vitals  /75 (BP 1 Location: Right arm, BP Patient Position: Sitting)   Pulse 99   Temp 98.1 °F (36.7 °C) (Oral)   Resp 18   Ht 5' 9\" (1.753 m)   Wt 205 lb (93 kg)   SpO2 99%   BMI 30.27 kg/m²     General:  Well appearing male no acute distress  HEENT:   PERRL,normal conjunctiva. External ear and canals normal, TMs normal.  Hearing normal to voice. Nose without edema or discharge, normal septum. Lips, teeth, gums normal.  Oropharynx: no erythema, no exudates, no lesions, normal tongue. Neck:  Supple. Thyroid normal size, nontender, without nodules. No carotid bruit. No masses or lymphadenopathy  Respiratory: no respiratory distress,  no wheezing, no rhonchi, no rales. No chest wall tenderness. Cardiovascular:  RRR, normal S1S2, no murmur. Gastrointestinal: normal bowel sounds, soft, nontender, without masses. No hepatosplenomegaly. Extremities +2 pulses, no edema, normal sensation   Musculoskeletal:  Normal gait. Normal digits and nails. Normal strength and tone, no atrophy, and no abnormal movement. Skin:  No rash, no lesions, no ulcers. Skin warm, normal turgor, without induration or nodules. Green yellow discoloration on right big toe   Neuro:  A and OX4, fluent speech, cranial nerves normal 2-12. Sensation normal to light touch.   DTR symmetrical  Psych:  Normal affect      No results found for: WBC, WBCLT, HGBPOC, HGB, HGBP, HCTPOC, HCT, PHCT, RBCH, PLT, MCV, HGBEXT, HCTEXT, PLTEXT, HGBEXT, HCTEXT, PLTEXT  Lab Results   Component Value Date/Time    Sodium 142 02/13/2020 04:50 PM    Potassium 4.2 02/13/2020 04:50 PM    Chloride 100 02/13/2020 04:50 PM    CO2 23 02/13/2020 04:50 PM    Glucose 148 (H) 02/13/2020 04:50 PM    BUN 11 02/13/2020 04:50 PM    Creatinine 0.83 02/13/2020 04:50 PM    BUN/Creatinine ratio 13 02/13/2020 04:50 PM    GFR est  02/13/2020 04:50 PM    GFR est non- 02/13/2020 04:50 PM    Calcium 9.7 02/13/2020 04:50 PM     Lab Results   Component Value Date/Time    Cholesterol, total 198 02/13/2020 04:50 PM    HDL Cholesterol 34 (L) 02/13/2020 04:50 PM    LDL,Direct 141 (H) 10/11/2016 02:08 PM    LDL, calculated 138 (H) 02/13/2020 04:50 PM    VLDL, calculated 26 02/13/2020 04:50 PM    Triglyceride 128 02/13/2020 04:50 PM     No results found for: TSH, TSH2, TSH3, TSHP, TSHEXT, TSHEXT  Lab Results   Component Value Date/Time    Hemoglobin A1c 6.8 (H) 02/13/2020 04:50 PM    Hemoglobin A1c (POC) 8.0 11/04/2019 08:45 AM    Hemoglobin A1c, External 6.8 06/09/2016     No results found for: VITD3, XQVID2, XQVID3, XQVID, VD3RIA                Assessment and Plan:     1. Hx of ulcerative colitis  - per patient resolved during teenager years after taking azathioprine, last colonoscopy over 10 years ago, discussed she needs to have colonoscopy done  - REFERRAL TO GASTROENTEROLOGY    2. Controlled type 2 diabetes mellitus without complication, with long-term current use of insulin ([address]  - sees Dr Jessica Starks, great control on glipizide, metformin and glargine      3. Idiopathic urticaria - on zyrtec  - TSH AND FREE T4    4. Nail fungus  - ciclopirox (PENLAC) 8 % solution; Apply 1 Each to affected area nightly for 90 days. Dispense: 1 Bottle; Refill: 2      Follow-up and Dispositions    · Return in about 1 year (around 3/19/2021).           Chinedu Hughes MD

## 2020-03-19 NOTE — PATIENT INSTRUCTIONS
Office Policies Phone calls/patient messages: Please allow up to 24 hours for someone in the office to contact you about your call or message. Be mindful your provider may be out of the office or your message may require further review. We encourage you to use [employer] for your messages as this is a faster, more efficient way to communicate with our office Medication Refills: 
         
Prescription medications require 48-72 business hours to process. We encourage you to use [employer] for your refills. For controlled medications: Please allow 72 business hours to process. Certain medications may require you to  a written prescription at our office. NO narcotic/controlled medications will be prescribed after 4pm Monday through Friday or on weekends Form/Paperwork Completion: 
         
Please note a $25 fee may incur for all paperwork for completed by our providers. We ask that you allow 7-10 business days. Pre-payment is due prior to picking up/faxing the completed form. You may also download your forms to [employer] to have your doctor print off.

## 2020-03-20 LAB
T4 FREE SERPL-MCNC: 1.38 NG/DL (ref 0.82–1.77)
TSH SERPL DL<=0.005 MIU/L-ACNC: 1.17 UIU/ML (ref 0.45–4.5)

## 2020-06-08 ENCOUNTER — OFFICE VISIT (OUTPATIENT)
Dept: PRIMARY CARE CLINIC | Age: 35
End: 2020-06-08

## 2020-06-08 VITALS
TEMPERATURE: 98.2 F | WEIGHT: 210 LBS | HEART RATE: 91 BPM | SYSTOLIC BLOOD PRESSURE: 115 MMHG | OXYGEN SATURATION: 96 % | DIASTOLIC BLOOD PRESSURE: 79 MMHG | BODY MASS INDEX: 31.1 KG/M2 | HEIGHT: 69 IN | RESPIRATION RATE: 16 BRPM

## 2020-06-08 DIAGNOSIS — K21.9 GASTROESOPHAGEAL REFLUX DISEASE WITHOUT ESOPHAGITIS: Primary | ICD-10-CM

## 2020-06-08 RX ORDER — PANTOPRAZOLE SODIUM 40 MG/1
40 TABLET, DELAYED RELEASE ORAL DAILY
Qty: 30 TAB | Refills: 1 | Status: SHIPPED | OUTPATIENT
Start: 2020-06-08 | End: 2020-07-08

## 2020-06-08 NOTE — PROGRESS NOTES
HISTORY OF PRESENT ILLNESS  Olamide Reyes is a 36 y.o. male. Patient reports epigastric pain starting last week. He has a history of gastric ulcers a few years ago when in college. He took zantac at that time, which helped. Patient denies diarrhea, constipation, vomiting, shortness of breath. He does report intermittent episodes of epigastric pain lasting about 30 minutes to an hour right after eating. He does eat a fair amount of spicy food in his diet and high fat. He is diabetic so he follows a low carb diet. No alcohol intake. He reports about 300 mg of caffeine daily. Patient has not tried anything for symptoms. Patient denies eating late night. Visit Vitals  /79   Pulse 91   Temp 98.2 °F (36.8 °C) (Oral)   Resp 16   Ht 5' 9\" (1.753 m)   Wt 210 lb (95.3 kg)   SpO2 96%   BMI 31.01 kg/m²       HPI    Review of Systems   Gastrointestinal: Positive for heartburn. Physical Exam  Constitutional:       Appearance: Normal appearance. Cardiovascular:      Rate and Rhythm: Normal rate and regular rhythm. Pulmonary:      Effort: Pulmonary effort is normal.      Breath sounds: Normal breath sounds. Abdominal:      General: Abdomen is flat. Bowel sounds are normal.      Palpations: Abdomen is soft. Tenderness: There is no abdominal tenderness (non-tender at time of visit). Skin:     General: Skin is warm and dry. Neurological:      General: No focal deficit present. Mental Status: He is alert and oriented to person, place, and time. Psychiatric:         Mood and Affect: Mood normal.         Behavior: Behavior normal.         ASSESSMENT and PLAN    ICD-10-CM ICD-9-CM    1.  Gastroesophageal reflux disease without esophagitis K21.9 530.81      Orders Placed This Encounter    pantoprazole (PROTONIX) 40 mg tablet   limit spicy foods over the next few weeks  Avoid eating at least 4-5 hours prior to bedtime  Daily protonix x 1 month  Follow up scheduled with GI later this month

## 2020-06-08 NOTE — PROGRESS NOTES
Chief Complaint   Patient presents with    Epigastric Pain     Patient in room #4 with complaints of upper abdominal pain, history of ulcers, needs advice on medication

## 2020-06-08 NOTE — PATIENT INSTRUCTIONS
Gastroesophageal Reflux Disease (GERD): Care Instructions Your Care Instructions Gastroesophageal reflux disease (GERD) is the backward flow of stomach acid into the esophagus. The esophagus is the tube that leads from your throat to your stomach. A one-way valve prevents the stomach acid from backing up into this tube. When you have GERD, this valve does not close tightly enough. This can also cause pain and swelling in your esophagus (esophagitis). If you have mild GERD symptoms including heartburn, you may be able to control the problem with antacids or over-the-counter medicine. Changing your diet and eating habits, such as not eating late at night, losing weight, and making other lifestyle changes can also help reduce symptoms. Follow-up care is a key part of your treatment and safety. Be sure to make and go to all appointments, and call your doctor if you are having problems. It's also a good idea to know your test results and keep a list of the medicines you take. How can you care for yourself at home? · Take your medicines exactly as prescribed. Call your doctor if you think you are having a problem with your medicine. · Your doctor may recommend over-the-counter medicine. For mild or occasional indigestion, antacids, such as Tums, Gaviscon, Mylanta, or Maalox, may help. Your doctor also may recommend over-the-counter acid reducers, such as Pepcid AC (famotidine), Tagamet HB (cimetidine), or Prilosec (omeprazole). Read and follow all instructions on the label. If you use these medicines often, talk with your doctor. · Change your eating habits. ? It's best to eat several small meals instead of two or three large meals. ? After you eat, wait 2 to 3 hours before you lie down. ? Chocolate, mint, and alcohol can make GERD worse. ? Spicy foods, foods that have a lot of acid (like tomatoes and oranges), and coffee can make GERD symptoms worse in some people.  If your symptoms are worse after you eat a certain food, you may want to stop eating that food to see if your symptoms get better. · Do not smoke or chew tobacco. Smoking can make GERD worse. If you need help quitting, talk to your doctor about stop-smoking programs and medicines. These can increase your chances of quitting for good. · If you have GERD symptoms at night, raise the head of your bed 6 to 8 inches by putting the frame on blocks or placing a foam wedge under the head of your mattress. (Adding extra pillows does not work.) · Do not wear tight clothing around your middle. · Lose weight if you need to. Losing just 5 to 10 pounds can help. When should you call for help? Call your doctor now or seek immediate medical care if: 
· You have new or different belly pain. · Your stools are black and tarlike or have streaks of blood. Watch closely for changes in your health, and be sure to contact your doctor if: 
· Your symptoms have not improved after 2 days. · Food seems to catch in your throat or chest. 
Where can you learn more? Go to http://joel-emil.info/ Enter G862 in the search box to learn more about \"Gastroesophageal Reflux Disease (GERD): Care Instructions. \" Current as of: August 12, 2019               Content Version: 12.5 © 6455-2621 [employer]. Care instructions adapted under license by [employer] (which disclaims liability or warranty for this information). If you have questions about a medical condition or this instruction, always ask your healthcare professional. [place] [address] any warranty or liability for your use of this information.

## 2020-06-09 ENCOUNTER — TELEPHONE (OUTPATIENT)
Dept: INTERNAL MEDICINE CLINIC | Age: 35
End: 2020-06-09

## 2020-06-09 NOTE — TELEPHONE ENCOUNTER
Angelika//Dr. Romain Charles's off. States patient is [facility] & she needs patient's Office notes & Labs faxed over for appt. Today. Please call if any questions.  Thank you      Fax# is [phone]   ATTN: Star Silveira

## 2020-06-15 ENCOUNTER — TELEPHONE (OUTPATIENT)
Dept: INTERNAL MEDICINE CLINIC | Age: 35
End: 2020-06-15

## 2020-06-15 ENCOUNTER — NURSE TRIAGE (OUTPATIENT)
Dept: OTHER | Facility: CLINIC | Age: 35
End: 2020-06-15

## 2020-06-15 NOTE — TELEPHONE ENCOUNTER
MD Bhupinder Kelley LPN   Caller: Unspecified (Today, 10:31 AM)             Advise patient to stay home if girlfriend test becomes positive -      [employer] message sent to patient with the above message from Dr. Christine Mckeon

## 2020-06-15 NOTE — TELEPHONE ENCOUNTER
Reason for Disposition   [1] Travel from area with community spread (identified by [facility]) AND [2] within last 14 days BUT [3] NO symptoms    Answer Assessment - Initial Assessment Questions  1. CLOSE CONTACT: \"Who is the person with the confirmed or suspected COVID-19 infection that you were exposed to? \"      Girlfriend's mother was positive. He had no direct contact with her but girlfriend saw her mother a couple of days ago. 2. PLACE of CONTACT: \"Where were you when you were exposed to COVID-19? \" (e.g., home, school, medical waiting room; which city?)       3. TYPE of CONTACT: \"How much contact was there? \" (e.g., sitting next to, live in same house, work in same office, same building)       4. DURATION of CONTACT: \"How long were you in contact with the COVID-19 patient? \" (e.g., a few seconds, passed by person, a few minutes, live with the patient)       5. DATE of CONTACT: \"When did you have contact with a COVID-19 patient? \" (e.g., how many days ago)       6. TRAVEL: \"Have you traveled out of the country recently? \" If so, \"When and where? \"      * Also ask about out-of-state travel, since the [facility] has identified some high-risk cities for community spread in the [address] [place] [address],[address]. * Note: Travel becomes less relevant if there is widespread community transmission where the patient lives. 7. COMMUNITY SPREAD: \"Are there lots of cases of COVID-19 (community spread) where you live? \" (See public health department website, if unsure)          8. SYMPTOMS: \"Do you have any symptoms? \" (e.g., fever, cough, breathing difficulty)     No symptoms  9. PREGNANCY OR POSTPARTUM: \"Is there any chance you are pregnant? \" \"When was your last menstrual period? \" \"Did you deliver in the last 2 weeks? \"   na  10. HIGH RISK: \"Do you have any heart or lung problems? Do you have a weak immune system? \" (e.g., CHF, COPD, asthma, HIV positive, chemotherapy, renal failure, diabetes mellitus, sickle cell anemia)    Protocols used: CORONAVIRUS (COVID-19) EXPOSURE-ADULT-  Pt calling with indirect COVID exposure. Lives with his girlfriend. Her other is positive and she had contact a couple of days ago. Girlfriend has a test pending. . currently no symptoms. Recommend he monitor for symptoms, call back if symptoms occur or if girlfiend's test is positive.

## 2020-10-30 ENCOUNTER — VIRTUAL VISIT (OUTPATIENT)
Dept: ENDOCRINOLOGY | Age: 35
End: 2020-10-30
Payer: COMMERCIAL

## 2020-10-30 DIAGNOSIS — Z79.4 TYPE 2 DIABETES MELLITUS WITHOUT COMPLICATION, WITH LONG-TERM CURRENT USE OF INSULIN (HCC): Primary | ICD-10-CM

## 2020-10-30 DIAGNOSIS — E78.00 PURE HYPERCHOLESTEROLEMIA: ICD-10-CM

## 2020-10-30 DIAGNOSIS — E11.9 TYPE 2 DIABETES MELLITUS WITHOUT COMPLICATION, WITH LONG-TERM CURRENT USE OF INSULIN (HCC): Primary | ICD-10-CM

## 2020-10-30 PROCEDURE — 3051F HG A1C>EQUAL 7.0%<8.0%: CPT | Performed by: INTERNAL MEDICINE

## 2020-10-30 PROCEDURE — 99214 OFFICE O/P EST MOD 30 MIN: CPT | Performed by: INTERNAL MEDICINE

## 2020-10-30 NOTE — PATIENT INSTRUCTIONS
Simvastatin (By mouth) Simvastatin ([facility]-va-STAT-in) Treats high cholesterol and triglyceride levels. May reduce the risk of heart attack, stroke, and related health conditions. This medicine is a statin. Brand Name(s): FloLipid, Zocor There may be other brand names for this medicine. When This Medicine Should Not Be Used: This medicine is not right for everyone. Do not use it if you had an allergic reaction to simvastatin, you are pregnant or breastfeeding. How to Use This Medicine:  
Liquid, Tablet · Take your medicine as directed. Your dose may need to be changed several times to find what works best for you. · Take the medicine in the evening, unless your doctor tells you otherwise. · Oral liquid: ¨ Take this medicine on the evening, on an empty stomach. ¨ Shake the oral suspension well for at least 20 seconds before using it. ¨ Measure the oral liquid medicine with a marked measuring spoon, oral syringe, or medicine cup. · Missed dose: Take a dose as soon as you remember. If it is almost time for your next dose, wait until then and take a regular dose. Do not take extra medicine to make up for a missed dose. · Store the medicine in a closed container at room temperature, away from heat, moisture, and direct light. Drugs and Foods to Avoid: Ask your doctor or pharmacist before using any other medicine, including over-the-counter medicines, vitamins, and herbal products. · Do not use this medicine together with boceprevir, clarithromycin, cobicistat, cyclosporine, danazol, erythromycin, gemfibrozil, itraconazole, ketoconazole, nefazodone, posaconazole, telaprevir, telithromycin, voriconazole, or medicines to treat HIV/AIDS. · Some foods and medicines can affect how simvastatin works. Tell your doctor if you are using any of the following: ¨ Amiodarone, colchicine, digoxin, dronedarone, lomitapide, niacin (vitamin B3), ranolazine ¨ Blood pressure medicine (including amlodipine, diltiazem, verapamil) ¨ Blood thinner (including warfarin) · Do not eat grapefruit or drink grapefruit juice while you are using this medicine. Warnings While Using This Medicine: · It is not safe to take this medicine during pregnancy. It could harm an unborn baby. Tell your doctor right away if you become pregnant. · Tell your doctor if you have kidney disease, liver disease, diabetes, thyroid disease, or a history of stroke or heart disease. Tell your doctor if you drink alcohol regularly or have a history of liver disease. · This medicine may cause liver problems, or myopathy or rhabdomyolysis (muscle damage). · Tell any doctor or dentist who treats you that you are using this medicine. You may need to stop using this medicine several days before you have surgery or medical tests. · Your doctor will do lab tests at regular visits to check on the effects of this medicine. Keep all appointments. · Keep all medicine out of the reach of children. Never share your medicine with anyone. Possible Side Effects While Using This Medicine:  
Call your doctor right away if you notice any of these side effects: · Allergic reaction: Itching or hives, swelling in your face or hands, swelling or tingling in your mouth or throat, chest tightness, trouble breathing · Blistering, peeling, red skin rash · Dark urine or pale stools, nausea, vomiting, loss of appetite, stomach pain, yellow skin or eyes · Fast or uneven heartbeat · Muscle pain, tenderness, or weakness · Unusual tiredness If you notice other side effects that you think are caused by this medicine, tell your doctor. Call your doctor for medical advice about side effects. You may report side effects to FDA at 9-835-FDA-0523 © 2017 [facility] Information is for End User's use only and may not be sold, redistributed or otherwise used for commercial purposes. The above information is an  only. It is not intended as medical advice for individual conditions or treatments. Talk to your doctor, nurse or pharmacist before following any medical regimen to see if it is safe and effective for you. Atorvastatin (By mouth) Atorvastatin (a-tor-va-STAT-in) Treats high cholesterol and triglyceride levels. Reduces the risk of angina, stroke, heart attack, or certain heart and blood vessel problems. This medicine is a statin. Brand Name(s): Lipitor There may be other brand names for this medicine. When This Medicine Should Not Be Used: This medicine is not right for everyone. Do not use it if you had an allergic reaction to atorvastatin, if you have active liver disease, or if you are pregnant or breastfeeding. How to Use This Medicine:  
Tablet · Take your medicine as directed. Your dose may need to be changed several times to find what works best for you. · Take this medicine at the same time each day. · Swallow the tablet whole. Do not break it. · Missed dose: Take a dose as soon as you remember. If it is less than 12 hours until your next dose, skip the missed dose and take the next dose at the regular time. Do not take 2 doses of this medicine within 12 hours. · Read and follow the patient instructions that come with this medicine. Talk to your doctor or pharmacist if you have any questions. · Store the medicine in a closed container at room temperature, away from heat, moisture, and direct light. Drugs and Foods to Avoid: Ask your doctor or pharmacist before using any other medicine, including over-the-counter medicines, vitamins, and herbal products. · Some medicines can affect how atorvastatin works. Tell your doctor if you also use birth control pills, boceprevir, cimetidine, colchicine, cyclosporine, digoxin, niacin, rifampin, spironolactone, telaprevir, medicine to treat an infection, or medicine to treat HIV/AIDS. Warnings While Using This Medicine: · It is not safe to take this medicine during pregnancy. It could harm an unborn baby. Tell your doctor right away if you become pregnant. · Tell your doctor if you have kidney disease, diabetes, muscle pain or weakness, thyroid problems, have recently had a stroke or TIA (transient ischemic attack), or have a history of liver disease. Tell your doctor if you drink grapefruit juice or drink alcohol regularly. · This medicine can cause muscle problems, which can lead to kidney problems. · Tell any doctor or dentist who treats you that you use this medicine. You may need to stop using it if you have surgery, have an injury, or develop serious health problems. · Your doctor will do lab tests at regular visits to check on the effects of this medicine. Keep all appointments. · Keep all medicine out of the reach of children. Never share your medicine with anyone. Possible Side Effects While Using This Medicine:  
Call your doctor right away if you notice any of these side effects: · Allergic reaction: Itching or hives, swelling in your face or hands, swelling or tingling in your mouth or throat, chest tightness, trouble breathing · Blistering, peeling, red skin rash · Change in how much or how often you urinate · Dark urine or pale stools, nausea, vomiting, loss of appetite, stomach pain, yellow skin or eyes · Fever · Muscle pain, tenderness, or weakness · Unusual tiredness If you notice these less serious side effects, talk with your doctor: · Diarrhea · Joint pain If you notice other side effects that you think are caused by this medicine, tell your doctor. Call your doctor for medical advice about side effects. You may report side effects to FDA at 0-643-FDA-8541 © 2017 [address] Information is for End User's use only and may not be sold, redistributed or otherwise used for commercial purposes. The above information is an  only. It is not intended as medical advice for individual conditions or treatments. Talk to your doctor, nurse or pharmacist before following any medical regimen to see if it is safe and effective for you. Rosuvastatin (By mouth) Rosuvastatin (rpz-hhi-ap-STAT-in) Treats high cholesterol and triglyceride levels. May reduce the risk of heart attack, stroke, and related health conditions. This medicine is a statin. Brand Name(s): Crestor There may be other brand names for this medicine. When This Medicine Should Not Be Used: This medicine is not right for everyone. Do not use it if you had an allergic reaction to rosuvastatin, you have active liver disease, or you are pregnant or breastfeeding. How to Use This Medicine:  
Tablet · Take your medicine as directed. Your dose may need to be changed several times to find what works best for you. · Swallow the tablet whole. Do not crush, break, or chew it. · Read and follow the patient instructions that come with this medicine. Talk to your doctor or pharmacist if you have any questions. · Missed dose: Take a dose as soon as you remember. If it is almost time for your next dose, wait until then and take a regular dose. Do not take extra medicine to make up for a missed dose. Do not take 2 doses within 12 hours. · Store the medicine in a closed container at room temperature, away from heat, moisture, and direct light. Drugs and Foods to Avoid: Ask your doctor or pharmacist before using any other medicine, including over-the-counter medicines, vitamins, and herbal products. · Some medicines can affect how rosuvastatin works. Tell your doctor if you are taking any of the following: ¨ A blood thinner, such as warfarin ¨ Cimetidine ¨ Cyclosporine ¨ Medicine to treat an infection, such as erythromycin, fluconazole, itraconazole, ketoconazole, atazanavir/ritonavir, or lopinavir/ritonavir ¨ Niacin (Vitamin B3) ¨ Spironolactone · If you need to take an antacid that contains aluminum and magnesium, take the antacid at least 2 hours after you take rosuvastatin. Warnings While Using This Medicine: · It is not safe to take this medicine during pregnancy. It could harm an unborn baby. Tell your doctor right away if you become pregnant. · Tell your doctor if you have kidney disease, diabetes, an underactive thyroid, a history of liver disease, or muscle pain or weakness. Tell your doctor if you usually have more than 2 drinks of alcohol per day. · Tell any doctor or dentist who treats you that you are using this medicine. You may need to stop using this medicine several days before you have surgery or medical tests. · Your doctor will do lab tests at regular visits to check on the effects of this medicine. Keep all appointments. · Keep all medicine out of the reach of children. Never share your medicine with anyone. Possible Side Effects While Using This Medicine:  
Call your doctor right away if you notice any of these side effects: · Allergic reaction: Itching or hives, swelling in your face or hands, swelling or tingling in your mouth or throat, chest tightness, trouble breathing · Change in how much or how often you urinate, cloudy urine, painful urination · Dark urine or pale stools, nausea, vomiting, loss of appetite, stomach pain, yellow skin or eyes · Muscle pain, tenderness, or weakness · Swelling in your hands, ankles, or feet · Unusual tiredness If you notice other side effects that you think are caused by this medicine, tell your doctor. Call your doctor for medical advice about side effects. You may report side effects to FDA at 2-927-FDA-0969 © 2017 [facility] Information is for End User's use only and may not be sold, redistributed or otherwise used for commercial purposes. The above information is an  only.  It is not intended as medical advice for individual conditions or treatments. Talk to your doctor, nurse or pharmacist before following any medical regimen to see if it is safe and effective for you.

## 2020-10-30 NOTE — PROGRESS NOTES
Chief Complaint   Patient presents with    Diabetes     pcp and pharmacy verified. Eye exam: this year  Doxy. me   Records since last visit reviewed          **THIS IS A VIRTUAL VISIT VIA A VIDEO ENCOUNTER. PATIENT AGREED TO HAVE THEIR CARE DELIVERED OVER VIDEO IN PLACE OF THEIR REGULARLY SCHEDULED OFFICE VISIT**      History of Present Illness: Kranthi Jones is a 36 y.o. male here for follow up of diabetes. Pt notes in January 2016 he was having issues of polyuria and polydipsia and was told by his friend, who is diabetic, was told he was probably diabetic as well. He notes that he checked his BG that day was 500. The next week he [facility] and his FGB was in the mid-200's and he was started on Metformin 500mg BID. A month later his FBG was 180's and his A1C was 10.6%. At our initial visit in March 2016 I tested pt for FRANK and Anti-insulin Ab, both of which were negative, his insulin and C-peptide levels were normal.  At our visit in February 2019 his BGs and A1C started to increase and at our last visit in February 2019, His A1C was upt to 10.3. I repeated his insulin and c-peptide levels. They were in a normal range, but in the face of a blood sugar of >200 they should have been high so I started pt on Basaglar 20 units daily. Pt notes he has changed jobs and he notes his job is more stressful, but he is happy with his new position. His A1C on 10/27/20 7.0%. Pt notes that he has tried [place] bunch of different low carb options\" and is still experimenting with various foods. Pt is checking his BGs 2-3 times per week. His BGs have been in the 90- low-200's range. Pt is taking the Glipizide 10mg BID and Metformin XR 1000mg BID and Basaglar 20 units HS. Pt is eating 1-2 meals daily. He wakes around 730AM.  He rarely eats breakfast and when he does it is typically a protein bar (8 carbs). He will have lunch 1-2 days per week. On the weekends he will have lunch.  He generally will go for a walk at lunch time and is not eating lunch. His has dinner between 6PM, last night he had chicken wings, no side items. He has not been snacking typically. Pt adopted a puppy \"he is gigantic and he is good\". He has not been working on his [employer]". No history of vascular disease. No history of retinopathy, neuropathy, or nephropathy. Pt has hx of UC but not symptoms since the age of 17. Pt notes he was having more GI pain so he saw a gastroenterologist. He had an EGD and Colonoscopy \"they did not see any evidence of UC\". He was started on a PPI. Pt has been diagnosed with SHARON but is not using CPAP. Last saw his eye doctor May 2018, \"everything looked good\". He will be seeing his eye doctor today. His LDL in February 2012 was 138 and his  and in October 2020 his LDL was 157. Pt is 37 y/o, his father has hx of CAD. We have discussed risks and most recent guidelines and research. Current Outpatient Medications   Medication Sig    metFORMIN ER (GLUCOPHAGE XR) 500 mg tablet TAKE TWO TABLETS BY MOUTH TWICE A DAY WITH BREAKFAST AND DINNER    insulin glargine (LANTUS,BASAGLAR) 100 unit/mL (3 mL) inpn BASAGLAR   20 units daily    glipiZIDE (GLUCOTROL) 10 mg tablet TAKE ONE TABLET BY MOUTH TWICE A DAY    glucose blood VI test strips (ACCU-CHEK SARA PLUS TEST STRP) strip Check blood sugar twice a day. (replaces Onetouch Verio)    Insulin Needles, Disposable, (KELSEY PEN NEEDLE) 32 gauge x 5/32\" ndle One shot daily    cetirizine (ZYRTEC) 10 mg tablet Take 10-20 mg by mouth. No current facility-administered medications for this visit. No Known Allergies  Review of Systems:  - Eyes: no blurry vision or double vision  - Cardiovascular: no chest pain  - Respiratory: no shortness of breath  - Musculoskeletal: no myalgias  - Neurological: no numbness/tingling in extremities    Physical Examination:  There were no vitals taken for this visit.   - GENERAL: NCAT, Appears well nourished   - EYES: EOMI, non-icteric, no proptosis   - Ear/Nose/Throat: NCAT, no visible inflammation or masses   - CARDIOVASCULAR: no cyanosis, no visible JVD   - RESPIRATORY: respiratory effort normal without any distress or labored breathing   - MUSCULOSKELETAL: Normal ROM of neck and upper extremities observed   - SKIN: No rash on face   - NEUROLOGIC:  No facial asymmetry (Cranial nerve 7 motor function), No gaze palsy   - PSYCHIATRIC: Normal affect, Normal insight and judgement     Data Reviewed:   Component      Latest Ref Rng & Units 10/27/2020 10/27/2020 10/27/2020 10/27/2020           3:57 PM  3:57 PM  3:57 PM  3:57 PM   Glucose      65 - 99 mg/dL 96      BUN      6 - 20 mg/dL 10      Creatinine      0.76 - 1.27 mg/dL 0.94      GFR est non-AA      >59 mL/min/1.73 106      GFR est AA      >59 mL/min/1.73 123      BUN/Creatinine ratio      9 - 20 11      Sodium      134 - 144 mmol/L 141      Potassium      3.5 - 5.2 mmol/L 3.9      Chloride      96 - 106 mmol/L 100      CO2      20 - 29 mmol/L 27      Calcium      8.7 - 10.2 mg/dL 10.2      Protein, total      6.0 - 8.5 g/dL 8.0      Albumin      4.0 - 5.0 g/dL 5.0      GLOBULIN, TOTAL      1.5 - 4.5 g/dL 3.0      A-G Ratio      1.2 - 2.2 1.7      Bilirubin, total      0.0 - 1.2 mg/dL 0.4      Alk. phosphatase      39 - 117 IU/L 89      AST      0 - 40 IU/L 21      ALT      0 - 44 IU/L 30      Cholesterol, total      100 - 199 mg/dL  210 (H)     Triglyceride      0 - 149 mg/dL  111     HDL Cholesterol      >39 mg/dL  33 (L)     VLDL Cholesterol Robinson      5 - 40 mg/dL  20     LDL Cholesterol      0 - 99 mg/dL  157 (H)     Creatinine, urine      Not Estab. mg/dL   51.8    Microalbumin, urine      Not Estab. ug/mL   3.1    Microalbumin/Creat. Ratio      0 - 29 mg/g creat   6    Hemoglobin A1c, (calculated)      4.8 - 5.6 %    7.0 (H)   Estimated average glucose      mg/dL    154       Assessment/Plan:   1) DM > His A1C in October was 7.0%.  Pt encouraged to keep up the good work and continue the Glipizide 10mg BID, Metformin XR 1000mg BID and Basaglar 20 units HS. We have discussed his insulin resistance and decreased pancreatic function. We have previously tested him for anti-insulin and anti-pancreactic Ab and they were negative. His c-peptide has been in then 2-3 range, though this has been in the face of Bgs in the 200's. His BP is at goal on no medications. 2) HLD > With the Keto diet his saturated fat intake was much higher. We discussed this in relation to his LDL. Pt is 37 y/o, his father has hx of CAD. We discussed risks and most recent guidelines and research. Pt wants to do some research first.    RTC 3 months    Pt voices understanding and agreement with the plan.

## 2021-01-28 ENCOUNTER — TELEPHONE (OUTPATIENT)
Dept: INTERNAL MEDICINE CLINIC | Age: 36
End: 2021-01-28

## 2021-02-03 DIAGNOSIS — E11.9 TYPE 2 DIABETES MELLITUS WITHOUT COMPLICATION, WITH LONG-TERM CURRENT USE OF INSULIN (HCC): Primary | ICD-10-CM

## 2021-02-03 DIAGNOSIS — E78.00 PURE HYPERCHOLESTEROLEMIA: ICD-10-CM

## 2021-02-03 DIAGNOSIS — Z79.4 TYPE 2 DIABETES MELLITUS WITHOUT COMPLICATION, WITH LONG-TERM CURRENT USE OF INSULIN (HCC): Primary | ICD-10-CM

## 2021-02-20 RX ORDER — METFORMIN HYDROCHLORIDE 500 MG/1
TABLET, EXTENDED RELEASE ORAL
Qty: 120 TAB | Refills: 2 | Status: SHIPPED | OUTPATIENT
Start: 2021-02-20 | End: 2021-05-04 | Stop reason: SDUPTHER

## 2021-02-20 RX ORDER — GLIPIZIDE 10 MG/1
TABLET ORAL
Qty: 60 TAB | Refills: 2 | Status: SHIPPED | OUTPATIENT
Start: 2021-02-20 | End: 2021-05-04 | Stop reason: SDUPTHER

## 2021-03-22 RX ORDER — INSULIN GLARGINE 100 [IU]/ML
INJECTION, SOLUTION SUBCUTANEOUS
Qty: 15 ML | Refills: 5 | Status: SHIPPED | OUTPATIENT
Start: 2021-03-22 | End: 2021-05-04 | Stop reason: SDUPTHER

## 2021-05-04 ENCOUNTER — VIRTUAL VISIT (OUTPATIENT)
Dept: ENDOCRINOLOGY | Age: 36
End: 2021-05-04
Payer: COMMERCIAL

## 2021-05-04 DIAGNOSIS — E11.9 TYPE 2 DIABETES MELLITUS WITHOUT COMPLICATION, WITH LONG-TERM CURRENT USE OF INSULIN (HCC): Primary | ICD-10-CM

## 2021-05-04 DIAGNOSIS — E78.00 PURE HYPERCHOLESTEROLEMIA: ICD-10-CM

## 2021-05-04 DIAGNOSIS — Z79.4 TYPE 2 DIABETES MELLITUS WITHOUT COMPLICATION, WITH LONG-TERM CURRENT USE OF INSULIN (HCC): Primary | ICD-10-CM

## 2021-05-04 PROCEDURE — 99214 OFFICE O/P EST MOD 30 MIN: CPT | Performed by: INTERNAL MEDICINE

## 2021-05-04 RX ORDER — METFORMIN HYDROCHLORIDE 500 MG/1
TABLET, EXTENDED RELEASE ORAL
Qty: 120 TAB | Refills: 2 | Status: SHIPPED | OUTPATIENT
Start: 2021-05-04 | End: 2021-08-16

## 2021-05-04 RX ORDER — GLIPIZIDE 10 MG/1
TABLET ORAL
Qty: 60 TAB | Refills: 5 | Status: SHIPPED | OUTPATIENT
Start: 2021-05-04 | End: 2021-11-18

## 2021-05-04 RX ORDER — BISMUTH SUBSALICYLATE 262 MG
1 TABLET,CHEWABLE ORAL DAILY
COMMUNITY

## 2021-05-04 RX ORDER — INSULIN GLARGINE 100 [IU]/ML
INJECTION, SOLUTION SUBCUTANEOUS
Qty: 15 ML | Refills: 5 | Status: SHIPPED | OUTPATIENT
Start: 2021-05-04 | End: 2021-06-03 | Stop reason: SDUPTHER

## 2021-05-04 RX ORDER — PEN NEEDLE, DIABETIC 31 GX3/16"
NEEDLE, DISPOSABLE MISCELLANEOUS
Qty: 100 PEN NEEDLE | Refills: 5 | Status: SHIPPED | OUTPATIENT
Start: 2021-05-04 | End: 2022-05-19 | Stop reason: SDUPTHER

## 2021-05-04 NOTE — PROGRESS NOTES
Chief Complaint   Patient presents with    Diabetes     pcp and pharmacy verified. Eye exam: 2020. DOXY. ME   Records since last visit reviewed          **THIS IS A VIRTUAL VISIT VIA A VIDEO ENCOUNTER. PATIENT AGREED TO HAVE THEIR CARE DELIVERED OVER VIDEO IN PLACE OF THEIR REGULARLY SCHEDULED OFFICE VISIT**      History of Present Illness: Sidney Husain. is a 30 y.o. male here for follow up of diabetes. Pt notes in January 2016 he was having issues of polyuria and polydipsia and was told by his friend, who is diabetic, was told he was probably diabetic as well. He notes that he checked his BG that day was 500. The next week he went to pt [facility] and his FGB was in the mid-200's and he was started on Metformin 500mg BID. A month later his FBG was 180's and his A1C was 10.6%. At our initial visit in March 2016 I tested pt for FRANK and Anti-insulin Ab, both of which were negative, his insulin and C-peptide levels were normal.  At our visit in February 2019 his BGs and A1C started to increase and at our last visit in February 2019, His A1C was upt to 10.3. I repeated his insulin and c-peptide levels. They were in a normal range, but in the face of a blood sugar of >200 they should have been high so I started pt on Basaglar 20 units daily. Pt notes he has changed jobs and he notes his job is more stressful, but he is happy with his new position. At our last visit in October 2021 his A1C was down to 7.0%. Pt was encouraged to keep up the good work. Pt denies any recent illnesses, injuries or hospitalizations since our last visit. Pt has received both COVID vaccinations (Lisa Pagjose). Pt is taking the Glipizide 10mg BID and Metformin XR 1000mg BID and Basaglar 20 units HS. Pt is checking his BGs once per week, typically at night. When he does check his BGs are in the 130s range. Pt is eating 1-2 meals daily.    He wakes around 7AM.  He rarely eats breakfast and when he does it is typically a protein bar (8 carbs). He will have lunch 5 days per week, around 2PM, yesterday he had keto protein bar, chicken tenders and diet soda. He generally will go for a walk at lunch time. His has dinner between 7PM, last night he had fajitas (no tortillas) and diet soda. He has not been snacking typically. Pt adopted a puppy \"he is gigantic and he is good\". He has not been working on his [employer]". No history of vascular disease. No history of retinopathy, neuropathy, or nephropathy. Pt has hx of UC but not symptoms since the age of 17. Pt notes he was having more GI pain so he saw a gastroenterologist. He had an EGD and Colonoscopy \"they did not see any evidence of UC\". Pt has been diagnosed with SHARON but is not using CPAP. Last saw his eye doctor May 2018, \"everything looked good\". He will be seeing his eye doctor today. His LDL in February 2012 was 138 and his  and in October 2020 his LDL was 157. Pt is 37 y/o, his father has hx of CAD. We have discussed risks and most recent guidelines and research. Current Outpatient Medications   Medication Sig    multivitamin (ONE A DAY) tablet Take 1 Tab by mouth daily.  insulin glargine (Basaglar KwikPen U-100 Insulin) 100 unit/mL (3 mL) inpn INJECT 20 UNITS UNDER THE SKIN DAILY    glipiZIDE (GLUCOTROL) 10 mg tablet TAKE ONE TABLET BY MOUTH TWICE A DAY    metFORMIN ER (GLUCOPHAGE XR) 500 mg tablet TAKE TWO TABLETS BY MOUTH TWICE A DAY WITH BREAKFAST AND DINNER    Insulin Needles, Disposable, (KELSEY PEN NEEDLE) 32 gauge x 5/32\" ndle One shot daily    cetirizine (ZYRTEC) 10 mg tablet Take 10-20 mg by mouth. No current facility-administered medications for this visit.       No Known Allergies  Review of Systems:  - Eyes: no blurry vision or double vision  - Cardiovascular: no chest pain  - Respiratory: no shortness of breath  - Musculoskeletal: no myalgias  - Neurological: no numbness/tingling in extremities    Physical Examination:  There were no vitals taken for this visit. - GENERAL: NCAT, Appears well nourished   - EYES: EOMI, non-icteric, no proptosis   - Ear/Nose/Throat: NCAT, no visible inflammation or masses   - CARDIOVASCULAR: no cyanosis, no visible JVD   - RESPIRATORY: respiratory effort normal without any distress or labored breathing   - MUSCULOSKELETAL: Normal ROM of neck and upper extremities observed   - SKIN: No rash on face   - NEUROLOGIC:  No facial asymmetry (Cranial nerve 7 motor function), No gaze palsy   - PSYCHIATRIC: Normal affect, Normal insight and judgement     Data Reviewed:   None    Assessment/Plan:   1) DM > His reports that when he does check his BGs they have been in a good range. He denies issues of hypoglycemia. Will order an A1C and for now pt to continue the Glipizide 10mg BID, Metformin XR 1000mg BID and Basaglar 20 units HS. We have discussed his insulin resistance and decreased pancreatic function. We have previously tested him for anti-insulin and anti-pancreactic Ab and they were negative. His c-peptide has been in then 2-3 range, though this has been in the face of Bgs in the 200's. His BP is at goal on no medications. 2) HLD > With the Keto diet his saturated fat intake was much higher. We discussed this in relation to his LDL. Pt is 29 y/o, his father has hx of CAD. We discussed risks and most recent guidelines and research. Will order a lipid panel and we can discuss his risks and if he needs to start any anti-lipid agents. RTC 4 months    Pt voices understanding and agreement with the plan.

## 2021-05-11 ENCOUNTER — OFFICE VISIT (OUTPATIENT)
Dept: PRIMARY CARE CLINIC | Age: 36
End: 2021-05-11

## 2021-05-11 VITALS
RESPIRATION RATE: 16 BRPM | TEMPERATURE: 97.9 F | HEIGHT: 69 IN | SYSTOLIC BLOOD PRESSURE: 112 MMHG | DIASTOLIC BLOOD PRESSURE: 76 MMHG | BODY MASS INDEX: 30.36 KG/M2 | OXYGEN SATURATION: 98 % | HEART RATE: 87 BPM | WEIGHT: 205 LBS
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
## 2021-05-11 DIAGNOSIS — E11.9 CONTROLLED TYPE 2 DIABETES MELLITUS WITHOUT COMPLICATION, WITH LONG-TERM CURRENT USE OF INSULIN (HCC): ICD-10-CM

## 2021-05-11 DIAGNOSIS — Z79.4 CONTROLLED TYPE 2 DIABETES MELLITUS WITHOUT COMPLICATION, WITH LONG-TERM CURRENT USE OF INSULIN (HCC): ICD-10-CM

## 2021-05-11 DIAGNOSIS — L29.9 ITCHING: ICD-10-CM

## 2021-05-11 DIAGNOSIS — S80.861A INSECT BITE OF RIGHT LOWER LEG, INITIAL ENCOUNTER: Primary | ICD-10-CM

## 2021-05-11 PROCEDURE — 99213 OFFICE O/P EST LOW 20 MIN: CPT | Performed by: NURSE PRACTITIONER

## 2021-05-11 NOTE — PATIENT INSTRUCTIONS

## 2021-05-11 NOTE — PROGRESS NOTES
Subjective:      Rafat Mooney is an 30 y.o. male who presents for evaluation of a possible bite wound to the anterior, lower, right leg. Patient awakened this morning and noticed small, red bumps on lower right leg. Describes as itchy. Has not tried any OTC medication for itching. Avoids using hydrocortisone cream due to history of elevated blood sugars on this medication. Denies gardening, working in yard, or going into woods. Endorses wearing long pants on yesterday. Denies any fever, chills, blisters. Has 2 indoor cats and 1 dog and reports has not seen any ticks or fleas. Pets up to date on check-ups. Of note, patient is a Type 2 Diabetic. Only checks BG when does not feel well. Did not check BG this morning or last night. Reports BG usually in 130s - 160s when patient does check them. Current Outpatient Medications   Medication Sig Dispense Refill    multivitamin (ONE A DAY) tablet Take 1 Tab by mouth daily.  insulin glargine (Basaglar KwikPen U-100 Insulin) 100 unit/mL (3 mL) inpn INJECT 20 UNITS UNDER THE SKIN DAILY 15 mL 5    Insulin Needles, Disposable, (Martha Pen Needle) 32 gauge x 5/32\" ndle One shot daily 100 Pen Needle 5    metFORMIN ER (GLUCOPHAGE XR) 500 mg tablet Two pills in the morning and two pills with dinner. 120 Tab 2    glipiZIDE (GLUCOTROL) 10 mg tablet One pill in the morning and one pill with dinner 60 Tab 5    cetirizine (ZYRTEC) 10 mg tablet Take 10-20 mg by mouth. Past Medical History:   Diagnosis Date    Diabetes ([address]     Gastrointestinal disorder     ulcerative colitis ?     Idiopathic urticaria     Sleep disorder     apnea    [facility]ative [facility]) ([facility]     resolved per patient after azathioprine during childhood         A comprehensive review of systems was obtained and negative except findings in the HPI    Objective:        Visit Vitals  /76 (BP 1 Location: Right arm, BP Patient Position: Sitting)   Pulse 87   Temp 97.9 °F (36.6 °C) (Oral)   Resp 16   Ht 5' 9\" (1.753 m)   Wt 205 lb (93 kg)   SpO2 98%   BMI 30.27 kg/m²     There is a small area of red scattered papules noted on lower right leg. No discharge, blisters noted. Left lower leg without evidence of rash. Assessment/Plan:       ICD-10-CM ICD-9-CM    1. Insect bite of right lower leg, initial encounter  S80.861A 916.4     W57. XXXA E906.4    2. Itching  L29.9 698.9    3. Controlled type 2 diabetes mellitus without complication, with long-term current use of insulin ([facility])  E11.9 250.00     Z79.4 V58.67      1. Apply OTC benadryl cream or Sarna lotion. 2. . A1c 7.0. Educated on the importance of checking blood sugars twice daily. 3. Patient to contact clinic or PCP for worsening rash, blisters, fever, increase blood sugars. Pt seen in collaboration with Tray Cao NP.     Emily Sauer NP

## 2021-05-11 NOTE — PROGRESS NOTES
RM 4    Chief Complaint   Patient presents with   Anmol Cooper 84     woke up this morning with red spots on lower right leg with itching       Visit Vitals  /76 (BP 1 Location: Right arm, BP Patient Position: Sitting)   Pulse 87   Temp 97.9 °F (36.6 °C) (Oral)   Resp 16   Ht 5' 9\" (1.753 m)   Wt 205 lb (93 kg)   SpO2 98%   BMI 30.27 kg/m²

## 2021-05-19 ENCOUNTER — TELEPHONE (OUTPATIENT)
Dept: ENDOCRINOLOGY | Age: 36
End: 2021-05-19

## 2021-05-19 ENCOUNTER — OFFICE VISIT (OUTPATIENT)
Dept: PRIMARY CARE CLINIC | Age: 36
End: 2021-05-19

## 2021-05-19 VITALS
WEIGHT: 204.4 LBS | RESPIRATION RATE: 16 BRPM | HEIGHT: 69 IN | OXYGEN SATURATION: 96 % | DIASTOLIC BLOOD PRESSURE: 87 MMHG | HEART RATE: 106 BPM | SYSTOLIC BLOOD PRESSURE: 127 MMHG | BODY MASS INDEX: 30.27 KG/M2 | TEMPERATURE: 98.1 F

## 2021-05-19 DIAGNOSIS — L03.115 CELLULITIS OF RIGHT LOWER EXTREMITY: ICD-10-CM

## 2021-05-19 DIAGNOSIS — L28.2 PRURITIC RASH: Primary | ICD-10-CM

## 2021-05-19 LAB — GLUCOSE POC: 306 MG/DL

## 2021-05-19 PROCEDURE — 82962 GLUCOSE BLOOD TEST: CPT | Performed by: NURSE PRACTITIONER

## 2021-05-19 PROCEDURE — 99213 OFFICE O/P EST LOW 20 MIN: CPT | Performed by: NURSE PRACTITIONER

## 2021-05-19 RX ORDER — CEPHALEXIN 500 MG/1
500 CAPSULE ORAL 3 TIMES DAILY
Qty: 21 CAPSULE | Refills: 0 | Status: SHIPPED | OUTPATIENT
Start: 2021-05-19 | End: 2021-05-26

## 2021-05-19 NOTE — TELEPHONE ENCOUNTER
For now, I recommend he increase his Basaglar from 20 units to 25units every night. This should help to lower his BGs. If his sugars do not improve to under the 150s range in about 3 days, then increase to 28 units.

## 2021-05-19 NOTE — PROGRESS NOTES
Chief Complaint   Patient presents with    Blood sugar problem     Patient in room #5 with complaints of high blood sugar at home

## 2021-05-19 NOTE — PATIENT INSTRUCTIONS

## 2021-05-19 NOTE — TELEPHONE ENCOUNTER
----- Message from Ayo Houston sent at 5/19/2021  9:09 AM EDT -----  Regarding: Dr. Jared Rodriguez: [phone]  General Message/Vendor Calls    Caller's first and last name:Pt      Reason for call:Pt has been experiencing higher blood sugar and would like a call back to discuss. Pt has also been to the [facility] and was told he might have either poison ivy or shingles and pt would like to discuss further on that. Callback required yes/no and why:Yes, discuss.        Best contact number(s):[phone]      Details to clarify the request:n/a      Ayo Houston

## 2021-05-19 NOTE — PROGRESS NOTES
HISTORY OF PRESENT ILLNESS  Nata Rosa is a 30 y.o. male. HPI  Chief Complaint   Patient presents with    Blood sugar problem     Patient in room #5 with complaints of high blood sugar at home       Pt presents with concerns about rash to RLE. He notes rash for 8 days with gradual improvement of redness but he's concerned about 2 bumps that have developed, worried about infection. Pt was seen here in clinic 5/11 for the pruritic rash which was thought to be due to possible insect bites. However no insects were seen. He is diabetic and prefers to avoid systemic steroids and steroid creams so was instructed to use benadryl cream.  Using benadryl cream but is concerned about possible infection as blood sugar today was in 300's. Denies any fevers, pain, or drainage. Redness to wound is improving compared to pictures that he shows me. Past Medical History:   Diagnosis Date    Diabetes ([facility] [address]     Gastrointestinal disorder     ulcerative colitis ?  Idiopathic urticaria     Sleep disorder     apnea    [facility]     resolved per patient after azathioprine during childhood     Past Surgical History:   Procedure Laterality Date    HX CYST REMOVAL      HX OTHER SURGICAL      pylonidal cyst     Current Outpatient Medications   Medication Instructions    cephALEXin (KEFLEX) 500 mg, Oral, 3 TIMES DAILY    cetirizine (ZYRTEC) 10-20 mg, Oral    glipiZIDE (GLUCOTROL) 10 mg tablet One pill in the morning and one pill with dinner    insulin glargine (Basaglar KwikPen U-100 Insulin) 100 unit/mL (3 mL) inpn INJECT 20 UNITS UNDER THE SKIN DAILY    Insulin Needles, Disposable, (Martha Pen Needle) 32 gauge x 5/32\" ndle One shot daily    metFORMIN ER (GLUCOPHAGE XR) 500 mg tablet Two pills in the morning and two pills with dinner.  multivitamin (ONE A DAY) tablet 1 Tablet, Oral, DAILY     No Known Allergies    Review of Systems   Constitutional: Negative for chills and fever. Gastrointestinal: Negative for nausea and vomiting. Skin: Positive for itching and rash. Physical Exam  Constitutional:       General: He is not in acute distress. Appearance: Normal appearance. He is not ill-appearing or toxic-appearing. Skin:     Findings: Rash present. Rash is papular. Comments: Rash to anterior right shin as noted in diagram with improvement from last week. Few papules noted. No vesicles or drainage. Neurological:      Mental Status: He is alert. ASSESSMENT and PLAN    ICD-10-CM ICD-9-CM    1. Pruritic rash  L28.2 698.2 AMB POC GLUCOSE BLOOD, BY GLUCOSE MONITORING DEVICE   2. Cellulitis of right lower extremity  L03.115 682.6      Pic from 5/13 has appearance of H.zoster. Pt did had chicken pox as child. Discussed this with pt. Will cover him with keflex for any possible cellulitis. May use calamine or Burow's solution to skin. Takes zyrtec daily for allergies, may take Benadryl prn qhs. Pt to follow-up with PCP/Endocrinology regarding elevated blood sugars. F/u prn.     Keira Mitchell NP

## 2021-05-19 NOTE — TELEPHONE ENCOUNTER
Spoke with patient. He states that he went to an urgent care center last week with what was diagnosed as bug bites. His \"bite\" got worse. He went back to urgent care. He was diagnosed with shingles. He noticed that his blood sugar has been running high from 200s to 300s. He was given Keflex and an ointment. He wonders if he needs to adjust any of his diabetic medication.

## 2021-05-20 LAB
ALBUMIN SERPL-MCNC: 4.7 G/DL (ref 4–5)
ALBUMIN/CREAT UR: 14 MG/G CREAT (ref 0–29)
ALBUMIN/GLOB SERPL: 2 {RATIO} (ref 1.2–2.2)
ALP SERPL-CCNC: 92 IU/L (ref 48–121)
ALT SERPL-CCNC: 25 IU/L (ref 0–44)
AST SERPL-CCNC: 17 IU/L (ref 0–40)
BILIRUB SERPL-MCNC: 0.3 MG/DL (ref 0–1.2)
BUN SERPL-MCNC: 16 MG/DL (ref 6–20)
BUN/CREAT SERPL: 21 (ref 9–20)
CALCIUM SERPL-MCNC: 9.7 MG/DL (ref 8.7–10.2)
CHLORIDE SERPL-SCNC: 101 MMOL/L (ref 96–106)
CHOLEST SERPL-MCNC: 223 MG/DL (ref 100–199)
CO2 SERPL-SCNC: 22 MMOL/L (ref 20–29)
CREAT SERPL-MCNC: 0.77 MG/DL (ref 0.76–1.27)
CREAT UR-MCNC: 90.2 MG/DL
EST. AVERAGE GLUCOSE BLD GHB EST-MCNC: 209 MG/DL
GLOBULIN SER CALC-MCNC: 2.4 G/DL (ref 1.5–4.5)
GLUCOSE SERPL-MCNC: 277 MG/DL (ref 65–99)
HBA1C MFR BLD: 8.9 % (ref 4.8–5.6)
HDLC SERPL-MCNC: 26 MG/DL
IMP & REVIEW OF LAB RESULTS: NORMAL
LDLC SERPL CALC-MCNC: 121 MG/DL (ref 0–99)
MICROALBUMIN UR-MCNC: 12.9 UG/ML
POTASSIUM SERPL-SCNC: 4.3 MMOL/L (ref 3.5–5.2)
PROT SERPL-MCNC: 7.1 G/DL (ref 6–8.5)
SODIUM SERPL-SCNC: 138 MMOL/L (ref 134–144)
TRIGL SERPL-MCNC: 426 MG/DL (ref 0–149)
VLDLC SERPL CALC-MCNC: 76 MG/DL (ref 5–40)

## 2021-06-03 RX ORDER — INSULIN GLARGINE 100 [IU]/ML
INJECTION, SOLUTION SUBCUTANEOUS
Qty: 30 ML | Refills: 5 | Status: SHIPPED | OUTPATIENT
Start: 2021-06-03 | End: 2021-06-03 | Stop reason: SDUPTHER

## 2021-06-03 RX ORDER — INSULIN GLARGINE 100 [IU]/ML
INJECTION, SOLUTION SUBCUTANEOUS
Qty: 30 ML | Refills: 5 | Status: SHIPPED | OUTPATIENT
Start: 2021-06-03 | End: 2022-01-19 | Stop reason: SDUPTHER

## 2022-01-19 ENCOUNTER — VIRTUAL VISIT (OUTPATIENT)
Dept: ENDOCRINOLOGY | Age: 37
End: 2022-01-19
Payer: COMMERCIAL

## 2022-01-19 DIAGNOSIS — E11.9 TYPE 2 DIABETES MELLITUS WITHOUT COMPLICATION, WITH LONG-TERM CURRENT USE OF INSULIN (HCC): Primary | ICD-10-CM

## 2022-01-19 DIAGNOSIS — E78.00 PURE HYPERCHOLESTEROLEMIA: ICD-10-CM

## 2022-01-19 DIAGNOSIS — Z79.4 TYPE 2 DIABETES MELLITUS WITHOUT COMPLICATION, WITH LONG-TERM CURRENT USE OF INSULIN (HCC): Primary | ICD-10-CM

## 2022-01-19 PROCEDURE — 99214 OFFICE O/P EST MOD 30 MIN: CPT | Performed by: INTERNAL MEDICINE

## 2022-01-19 RX ORDER — INSULIN GLARGINE 100 [IU]/ML
INJECTION, SOLUTION SUBCUTANEOUS
Qty: 30 ML | Refills: 5 | Status: SHIPPED | OUTPATIENT
Start: 2022-01-19 | End: 2022-05-19 | Stop reason: SDUPTHER

## 2022-01-19 RX ORDER — METFORMIN HYDROCHLORIDE 500 MG/1
TABLET, EXTENDED RELEASE ORAL
Qty: 360 TABLET | Refills: 3 | Status: SHIPPED | OUTPATIENT
Start: 2022-01-19

## 2022-01-19 RX ORDER — GLIPIZIDE 10 MG/1
TABLET ORAL
Qty: 180 TABLET | Refills: 3 | Status: SHIPPED | OUTPATIENT
Start: 2022-01-19

## 2022-01-19 NOTE — LETTER
1/19/2022 12:33 PM    Patient:  Ella Townsend. YOB: 1985  Date of Visit: 1/19/2022      Dear Stefan Walker MD  [address]  [place] [address]  [address] [facility]: Thank you for referring Mr. Tessa Fleming to me for evaluation/treatment. Below are the relevant portions of my assessment and plan of care. If you have questions, please do not hesitate to call me. I look forward to following Mr. Regan along with you. Chief Complaint   Patient presents with    Diabetes     pcp and pharmacy verified   [facility]  [phone] (M)    Records since last visit reviewed          **THIS IS A VIRTUAL VISIT VIA A VIDEO ENCOUNTER. PATIENT AGREED TO HAVE THEIR CARE DELIVERED OVER VIDEO IN PLACE OF THEIR REGULARLY SCHEDULED OFFICE VISIT**      History of Present Illness: Ella Townsend. is a 29 y.o. male here for follow up of diabetes. Pt notes in January 2016 he was having issues of polyuria and polydipsia and was told by his friend, who is diabetic, was told he was probably diabetic as well. He notes that he checked his BG that day was 500. The next week he went to [facility] and his FGB was in the mid-200's and he was started on Metformin 500mg BID. A month later his FBG was 180's and his A1C was 10.6%. At our initial visit in March 2016 I tested pt for FRANK and Anti-insulin Ab, both of which were negative, his insulin and C-peptide levels were normal.    \"Everything has been pretty regular, but we have sold our house and are currently living with my In-laws so our diet has gotten worse. Also I had COVID two weeks ago. \"    He notes his BGs have been higher. He is taking Basaglar 30 units per day, Glipizide 10mg BID, Metformin 1000mg BID. Pt has received both COVID vaccinations ([place] [facility]). Pt is checking his BGs once per week, typically at night. When he does check his BGs are in the 100-200s range. Pt is eating 1-2 meals daily.    He wakes around 7AM.  He rarely eats breakfast and when he does it is typically a protein bar (8 carbs). He will have lunch 5 days per week, around 1-2PM, yesterday he a chicken wrap and diet soda. He generally will go for a walk at lunch time. His has dinner between 7PM, last night he did not eat dinner. He has not been snacking typically. Pt adopted a puppy \"he is gigantic and he is good\". He has not been working on his [employer]". No history of vascular disease. No history of retinopathy, neuropathy, or nephropathy. Pt has hx of UC but not symptoms since the age of 17. Pt notes he was having more GI pain so he saw a gastroenterologist. He had an EGD and Colonoscopy \"they did not see any evidence of UC\". Pt has been diagnosed with SHARON but is not using CPAP. Last saw his eye doctor May 2018, \"everything looked good\". He will be seeing his eye doctor today. His LDL in February 2012 was 138 and his  and in October 2020 his LDL was 157. Pt is 35 y/o, his father has hx of CAD. We have discussed risks and most recent guidelines and research. He declined starting a statin. Current Outpatient Medications   Medication Sig    glipiZIDE (GLUCOTROL) 10 mg tablet TAKE ONE TABLET BY MOUTH EVERY MORNING AND TAKE ONE TABLET BY MOUTH EVERY EVENING AT DINNER    metFORMIN ER (GLUCOPHAGE XR) 500 mg tablet TAKE TWO TABLETS BY MOUTH EVERY MORNING AND TAKE TWO TABLETS BY MOUTH EVERY EVENING WITH DINNER    insulin glargine (Basaglar KwikPen U-100 Insulin) 100 unit/mL (3 mL) inpn INJECT 30 UNITS UNDER THE SKIN DAILY    multivitamin (ONE A DAY) tablet Take 1 Tab by mouth daily.  Insulin Needles, Disposable, (Martha Pen Needle) 32 gauge x 5/32\" ndle One shot daily    cetirizine (ZYRTEC) 10 mg tablet Take 10-20 mg by mouth. No current facility-administered medications for this visit.      No Known Allergies  Review of Systems:  - Eyes: no blurry vision or double vision  - Cardiovascular: no chest pain  - Respiratory: no shortness of breath  - Musculoskeletal: no myalgias  - Neurological: no numbness/tingling in extremities    Physical Examination:  There were no vitals taken for this visit. - GENERAL: NCAT, Appears well nourished   - EYES: EOMI, non-icteric, no proptosis   - Ear/Nose/Throat: NCAT, no visible inflammation or masses   - CARDIOVASCULAR: no cyanosis, no visible JVD   - RESPIRATORY: respiratory effort normal without any distress or labored breathing   - MUSCULOSKELETAL: Normal ROM of neck and upper extremities observed   - SKIN: No rash on face   - NEUROLOGIC:  No facial asymmetry (Cranial nerve 7 motor function), No gaze palsy   - PSYCHIATRIC: Normal affect, Normal insight and judgement     Data Reviewed:   None    Assessment/Plan:   1) DM > His BGS have been higher so will have pt increase his Glargine to 35 units daily and will start pt on Ozempic 0.5mg weekly. We discussed the mechanism of action of the GLP-1 agents and the risk vs benefits, including Nausea, pancreatitis and hypoglycemia. Pt given written information about Ozemic. His reports that when he does check his BGs they have been in a good range. He denies issues of hypoglycemia. Pt to continue the Glipizide 10mg BID, Metformin XR 1000mg BID. We have discussed his insulin resistance and decreased pancreatic function. We have previously tested him for anti-insulin and anti-pancreactic Ab and they were negative. His c-peptide has been in then 2-3 range, though this has been in the face of Bgs in the 200's. His BP has consistently been at goal on no medications. 2) HLD > With the Keto diet his saturated fat intake was much higher. We discussed this in relation to his LDL. Pt is 31 y/o, his father has hx of CAD. We discussed risks and most recent guidelines and research.   He has declined starting an anti-lipid agent in the past. Will repeat his lipid panel and CMP    RTC 4 months    Pt voices understanding and agreement with the plan.             Sincerely,      Inge Hammans, MD

## 2022-01-19 NOTE — PATIENT INSTRUCTIONS
1) Increase your Basaglar to 35 units every day. 2) Ozempic: Start at 0.25mg every week for 4 weeks. After 4 weeks, increase to 0.5mg every week. When This Medicine Should Not Be Used: This medicine is not right for everyone. Do not use it if you had an allergic reaction to dulaglutide, you have multiple endocrine neoplasia syndrome type 2 (MEN 2), or you or anyone in your family has had medullary thyroid cancer. How to Use This Medicine:   Injectable  · Your doctor will prescribe your exact dose. This medicine is usually given once a week, on the same day of the week. It is given as a shot under the skin of your stomach, thighs, or upper arms. · You may be taught how to give your medicine at home. Make sure you understand all instructions before giving yourself an injection. Do not use more medicine or use it more often than your doctor tells you to. · If you use insulin in addition to this medicine, do not mix them in the same syringe. You may give the shots in the same area (such as your stomach), but do not give the shots right next to each other. · If the medicine in the pen or prefilled syringe has changed color, looks cloudy, or has particles in it, do not use it. · You will be shown the body areas where this shot can be given. Use a different body area each time you give yourself a shot. Keep track of where you give each shot to make sure you rotate body areas. · Use a new needle and syringe each time you inject your medicine. · This medicine should come with a Medication Guide. Ask your pharmacist for a copy if you do not have one. · Missed dose: Use a missed dose as soon as possible if there are at least 3 days before your next dose is due. If your next dose is due in less than 3 days, then skip the missed dose. · Store your medicine pens or prefilled syringes in the refrigerator and keep them in the original carton. Protect the pens from light.  You may also store the pens at room temperature for up to 14 days. Do not freeze the medicine, and do not use the medicine if it has been frozen. Drugs and Foods to Avoid:      Ask your doctor or pharmacist before using any other medicine, including over-the-counter medicines, vitamins, and herbal products. Warnings While Using This Medicine:   · Tell your doctor if you are pregnant or breastfeeding, or if you have kidney disease or a history of pancreas problems. Tell your doctor if you have severe digestion problems (such as gastroparesis) or stomach or bowel problems. · This medicine may cause the following problems:  ¨ Increased risk of thyroid tumor  ¨ Pancreatitis  ¨ Low blood sugar (more likely if you also take insulin or other diabetes medicine)  · Your doctor will do lab tests at regular visits to check on the effects of this medicine. Keep all appointments. · Keep all medicine out of the reach of children. Never share your medicine with anyone. Do not share needles or pens because you can spread an infection. Possible Side Effects While Using This Medicine:   Call your doctor right away if you notice any of these side effects:  · Allergic reaction: Itching or hives, swelling in your face or hands, swelling or tingling in your mouth or throat, chest tightness, trouble breathing  · A lump or swelling in your neck, trouble breathing or swallowing  · Change in how much or how often you urinate  · Shaking, trembling, sweating, fast or pounding heartbeat, lightheadedness, hunger, confusion  · Sudden and severe stomach pain, nausea, vomiting, fever, and lightheadedness  If you notice these less serious side effects, talk with your doctor:   · Diarrhea  · Redness, itching, swelling, or any changes in your skin where the shot was given  If you notice other side effects that you think are caused by this medicine, tell your doctor. Call your doctor for medical advice about side effects.  You may report side effects to FDA at 6-083-FDA-9446  © 2017 [facility] Information is for End User's use only and may not be sold, redistributed or otherwise used for commercial purposes. The above information is an  only. It is not intended as medical advice for individual conditions or treatments. Talk to your doctor, nurse or pharmacist before following any medical regimen to see if it is safe and effective for you.

## 2022-01-19 NOTE — PROGRESS NOTES
Chief Complaint   Patient presents with    Diabetes     pcp and pharmacy verified   [facility] [place]  [phone] (M)    Records since last visit reviewed          **THIS IS A VIRTUAL VISIT VIA A VIDEO ENCOUNTER. PATIENT AGREED TO HAVE THEIR CARE DELIVERED OVER VIDEO IN PLACE OF THEIR REGULARLY SCHEDULED OFFICE VISIT**      History of Present Illness: Renee Dinh is a 29 y.o. male here for follow up of diabetes. Pt notes in January 2016 he was having issues of polyuria and polydipsia and was told by his friend, who is diabetic, was told he was probably diabetic as well. He notes that he checked his BG that day was 500. The next week he [facility] and his FGB was in the mid-200's and he was started on Metformin 500mg BID. A month later his FBG was 180's and his A1C was 10.6%. At our initial visit in March 2016 I tested pt for FRANK and Anti-insulin Ab, both of which were negative, his insulin and C-peptide levels were normal.    \"Everything has been pretty regular, but we have sold our house and are currently living with my In-laws so our diet has gotten worse. Also I had COVID two weeks ago. \"    He notes his BGs have been higher. He is taking Basaglar 30 units per day, Glipizide 10mg BID, Metformin 1000mg BID. Pt has received both COVID vaccinations ([facility] ). Pt is checking his BGs once per week, typically at night. When he does check his BGs are in the 100-200s range. Pt is eating 1-2 meals daily. He wakes around 7AM.  He rarely eats breakfast and when he does it is typically a protein bar (8 carbs). He will have lunch 5 days per week, around 1-2PM, yesterday he a chicken wrap and diet soda. He generally will go for a walk at lunch time. His has dinner between 7PM, last night he did not eat dinner. He has not been snacking typically. Pt adopted a puppy \"he is gigantic and he is good\". He has not been working on his [employer]". No history of vascular disease.   No history of retinopathy, neuropathy, or nephropathy. Pt has hx of UC but not symptoms since the age of 17. Pt notes he was having more GI pain so he saw a gastroenterologist. He had an EGD and Colonoscopy \"they did not see any evidence of UC\". Pt has been diagnosed with SHARON but is not using CPAP. Last saw his eye doctor May 2018, \"everything looked good\". He will be seeing his eye doctor today. His LDL in February 2012 was 138 and his  and in October 2020 his LDL was 157. Pt is 35 y/o, his father has hx of CAD. We have discussed risks and most recent guidelines and research. He declined starting a statin. Current Outpatient Medications   Medication Sig    metFORMIN ER (GLUCOPHAGE XR) 500 mg tablet TAKE TWO TABLETS BY MOUTH EVERY MORNING AND TAKE TWO TABLETS BY MOUTH EVERY EVENING WITH DINNER    insulin glargine (Basaglar KwikPen U-100 Insulin) 100 unit/mL (3 mL) inpn INJECT 40 UNITS UNDER THE SKIN DAILY    glipiZIDE (GLUCOTROL) 10 mg tablet TAKE ONE TABLET BY MOUTH EVERY MORNING AND TAKE ONE TABLET BY MOUTH EVERY EVENING AT DINNER    semaglutide (OZEMPIC) 0.25 mg or 0.5 mg/dose (2 mg/1.5 ml) subq pen 0.5 mg by SubCUTAneous route every seven (7) days.  multivitamin (ONE A DAY) tablet Take 1 Tab by mouth daily.  Insulin Needles, Disposable, (Martha Pen Needle) 32 gauge x 5/32\" ndle One shot daily    cetirizine (ZYRTEC) 10 mg tablet Take 10-20 mg by mouth. No current facility-administered medications for this visit. No Known Allergies  Review of Systems:  - Eyes: no blurry vision or double vision  - Cardiovascular: no chest pain  - Respiratory: no shortness of breath  - Musculoskeletal: no myalgias  - Neurological: no numbness/tingling in extremities    Physical Examination:  There were no vitals taken for this visit.   - GENERAL: NCAT, Appears well nourished   - EYES: EOMI, non-icteric, no proptosis   - Ear/Nose/Throat: NCAT, no visible inflammation or masses   - CARDIOVASCULAR: no cyanosis, no visible JVD   - RESPIRATORY: respiratory effort normal without any distress or labored breathing   - MUSCULOSKELETAL: Normal ROM of neck and upper extremities observed   - SKIN: No rash on face   - NEUROLOGIC:  No facial asymmetry (Cranial nerve 7 motor function), No gaze palsy   - PSYCHIATRIC: Normal affect, Normal insight and judgement     Data Reviewed:   None    Assessment/Plan:   1) DM > His BGS have been higher so will have pt increase his Glargine to 35 units daily and will start pt on Ozempic 0.5mg weekly. We discussed the mechanism of action of the GLP-1 agents and the risk vs benefits, including Nausea, pancreatitis and hypoglycemia. Pt given written information about Ozemic. His reports that when he does check his BGs they have been in a good range. He denies issues of hypoglycemia. Pt to continue the Glipizide 10mg BID, Metformin XR 1000mg BID. We have discussed his insulin resistance and decreased pancreatic function. We have previously tested him for anti-insulin and anti-pancreactic Ab and they were negative. His c-peptide has been in then 2-3 range, though this has been in the face of Bgs in the 200's. His BP has consistently been at goal on no medications. 2) HLD > With the Keto diet his saturated fat intake was much higher. We discussed this in relation to his LDL. Pt is 31 y/o, his father has hx of CAD. We discussed risks and most recent guidelines and research. He has declined starting an anti-lipid agent in the past. Will repeat his lipid panel and CMP    RTC 4 months    Pt voices understanding and agreement with the plan. Follow-up and Dispositions    · Return in about 4 months (around 5/19/2022).

## 2022-02-07 ENCOUNTER — HOSPITAL ENCOUNTER (OUTPATIENT)
Dept: GENERAL RADIOLOGY | Age: 37
Discharge: HOME | End: 2022-02-07
Payer: COMMERCIAL

## 2022-02-07 ENCOUNTER — OFFICE VISIT (OUTPATIENT)
Dept: PRIMARY CARE CLINIC | Age: 37
End: 2022-02-07

## 2022-02-07 ENCOUNTER — TELEPHONE (OUTPATIENT)
Dept: ENDOCRINOLOGY | Age: 37
End: 2022-02-07

## 2022-02-07 VITALS
DIASTOLIC BLOOD PRESSURE: 82 MMHG | SYSTOLIC BLOOD PRESSURE: 134 MMHG | BODY MASS INDEX: 30.81 KG/M2 | HEART RATE: 115 BPM | OXYGEN SATURATION: 98 % | HEIGHT: 69 IN | TEMPERATURE: 98.4 F | WEIGHT: 208 LBS | RESPIRATION RATE: 16 BRPM

## 2022-02-07 DIAGNOSIS — E11.9 TYPE 2 DIABETES MELLITUS WITHOUT COMPLICATION, WITH LONG-TERM CURRENT USE OF INSULIN (HCC): ICD-10-CM

## 2022-02-07 DIAGNOSIS — Z79.4 TYPE 2 DIABETES MELLITUS WITHOUT COMPLICATION, WITH LONG-TERM CURRENT USE OF INSULIN (HCC): ICD-10-CM

## 2022-02-07 DIAGNOSIS — R11.0 NAUSEA: ICD-10-CM

## 2022-02-07 DIAGNOSIS — R10.12 LEFT UPPER QUADRANT ABDOMINAL PAIN: ICD-10-CM

## 2022-02-07 DIAGNOSIS — T50.905A ADVERSE EFFECT OF DRUG, INITIAL ENCOUNTER: ICD-10-CM

## 2022-02-07 DIAGNOSIS — R10.13 DYSPEPSIA: ICD-10-CM

## 2022-02-07 DIAGNOSIS — R07.89 CHEST DISCOMFORT: Primary | ICD-10-CM

## 2022-02-07 DIAGNOSIS — K59.00 CONSTIPATION, UNSPECIFIED CONSTIPATION TYPE: ICD-10-CM

## 2022-02-07 DIAGNOSIS — R53.83 FATIGUE, UNSPECIFIED TYPE: ICD-10-CM

## 2022-02-07 DIAGNOSIS — R07.89 CHEST DISCOMFORT: ICD-10-CM

## 2022-02-07 PROCEDURE — 93000 ELECTROCARDIOGRAM COMPLETE: CPT | Performed by: NURSE PRACTITIONER

## 2022-02-07 PROCEDURE — 99214 OFFICE O/P EST MOD 30 MIN: CPT | Performed by: NURSE PRACTITIONER

## 2022-02-07 PROCEDURE — 71046 X-RAY EXAM CHEST 2 VIEWS: CPT

## 2022-02-07 RX ORDER — ONDANSETRON 4 MG/1
4 TABLET, FILM COATED ORAL
Qty: 10 TABLET | Refills: 0 | Status: SHIPPED | OUTPATIENT
Start: 2022-02-07 | End: 2022-05-19

## 2022-02-07 RX ORDER — OMEPRAZOLE 20 MG/1
CAPSULE, DELAYED RELEASE ORAL
COMMUNITY
End: 2022-02-07 | Stop reason: ALTCHOICE

## 2022-02-07 RX ORDER — POLYETHYLENE GLYCOL 3350 17 G/17G
17 POWDER, FOR SOLUTION ORAL DAILY
Qty: 24 EACH | Refills: 0 | Status: SHIPPED | OUTPATIENT
Start: 2022-02-07 | End: 2022-05-19

## 2022-02-07 RX ORDER — PANTOPRAZOLE SODIUM 40 MG/1
TABLET, DELAYED RELEASE ORAL
COMMUNITY
End: 2022-02-07 | Stop reason: ALTCHOICE

## 2022-02-07 RX ORDER — FAMOTIDINE 20 MG/1
20 TABLET, FILM COATED ORAL 2 TIMES DAILY
Qty: 20 TABLET | Refills: 0 | Status: SHIPPED | OUTPATIENT
Start: 2022-02-07 | End: 2022-02-17

## 2022-02-07 NOTE — TELEPHONE ENCOUNTER
Spoke with patient. He states he has taken 2 doses of Ozempic a week apart. Each time he took it, he developed nausea and felt like vomiting. Lots of belching. No appetite. Constipated. No BM for 6 days. Last Tuesday was his last Ozempic dose. He feels only a little better.

## 2022-02-07 NOTE — TELEPHONE ENCOUNTER
2/7/2022  10:03 AM    Patient called due to having side effects from ozempic and wants to come off of meds.  Please call him back at [phone]-

## 2022-02-07 NOTE — PROGRESS NOTES
Chief Complaint   Patient presents with    Nausea     Patient in room #4 with complaints of nausea and chest discomfort, used Ozempic and stopped

## 2022-02-07 NOTE — TELEPHONE ENCOUNTER
Stop the Ozempic since he is not tolerating it. Once he has been off the Ozempic for 2-3 weeks we could discuss trying an alternative agent to help with his blood sugar that he would tolerate better.

## 2022-02-07 NOTE — PROGRESS NOTES
HISTORY OF PRESENT ILLNESS  Candis Askew is a 29 y.o. male. HPI  Chief Complaint   Patient presents with    Nausea     Patient in room #4 with complaints of nausea and chest discomfort, used Ozempic and stopped     Pt presents with complaints of left upper abdominal discomfort, bloating, nausea, belching, and constipation for a few days. Today nausea worsened, near vomiting. Today he also had chest discomfort. Denies any cough or fevers. Some STOREY, no SOB. Symptoms started shortly after taking Ozempic. First dose of Ozempic 1/18, second dose 1/25. Nearly immediately noticed stomach fullness, early satiety with a gradual progression of worsening symptoms. He called his endocrinologist today who advised he stop taking. Of note, he had Covid in mid-January. Still c/o significant fatigue. Reports decreased appetite. Has not tried anything for his symptoms. Past Medical History:   Diagnosis Date    Diabetes ([facility] [address]     Gastrointestinal disorder     ulcerative colitis ?  Idiopathic urticaria     Sleep disorder     apnea    UC (ulcerative colitis) ([address]     resolved per patient after azathioprine during childhood     Past Surgical History:   Procedure Laterality Date    HX CYST REMOVAL      HX OTHER SURGICAL      pylonidal cyst     No Known Allergies    Review of Systems   Constitutional: Positive for malaise/fatigue. Negative for chills, fever and weight loss. Respiratory: Negative for cough, sputum production, shortness of breath and wheezing. Cardiovascular: Positive for chest pain. Negative for leg swelling. Gastrointestinal: Positive for abdominal pain, constipation, heartburn and nausea. Negative for blood in stool, diarrhea, melena and vomiting. Skin: Negative for itching and rash. Neurological: Negative for dizziness and headaches. Physical Exam  Constitutional:       General: He is not in acute distress. Appearance: Normal appearance.  He is not ill-appearing or toxic-appearing. HENT:      Head: Normocephalic and atraumatic. Nose: Nose normal.      Mouth/Throat:      Mouth: Mucous membranes are moist.      Pharynx: Oropharynx is clear. Eyes:      General: No scleral icterus. Extraocular Movements: Extraocular movements intact. Conjunctiva/sclera: Conjunctivae normal.      Pupils: Pupils are equal, round, and reactive to light. Cardiovascular:      Rate and Rhythm: Regular rhythm. Tachycardia present. Pulmonary:      Effort: Pulmonary effort is normal.      Breath sounds: Normal breath sounds and air entry. Abdominal:      General: Bowel sounds are normal.      Palpations: Abdomen is soft. Tenderness: There is generalized abdominal tenderness. There is no guarding or rebound. Musculoskeletal:      Cervical back: Normal range of motion and neck supple. Lymphadenopathy:      Cervical: No cervical adenopathy. Skin:     General: Skin is warm and dry. Capillary Refill: Capillary refill takes less than 2 seconds. Neurological:      General: No focal deficit present. Mental Status: He is alert and oriented to person, place, and time. Psychiatric:         Mood and Affect: Mood normal.         Behavior: Behavior normal.         Thought Content: Thought content normal.         Judgment: Judgment normal.     12 lead EKG - Sinus Rhythm with rate of 98    ASSESSMENT and PLAN    ICD-10-CM ICD-9-CM    1. Chest discomfort  R07.89 786.59 AMB POC EKG ROUTINE W/ 12 LEADS, INTER & REP      XR CHEST PA LAT   2. Dyspepsia  R10.13 536.8 AMB POC EKG ROUTINE W/ 12 LEADS, INTER & REP   3. Left upper quadrant abdominal pain  R10.12 789.02 CBC WITH AUTOMATED DIFF      METABOLIC PANEL, COMPREHENSIVE      AMYLASE      LIPASE      CBC WITH AUTOMATED DIFF      METABOLIC PANEL, COMPREHENSIVE      AMYLASE      LIPASE   4. Nausea  R11.0 787.02    5. Constipation, unspecified constipation type  K59.00 564.00    6.  Adverse effect of drug, initial encounter T50.905A E947.9 CBC WITH AUTOMATED DIFF      METABOLIC PANEL, COMPREHENSIVE      AMYLASE      LIPASE      CBC WITH AUTOMATED DIFF      METABOLIC PANEL, COMPREHENSIVE      AMYLASE      LIPASE   7. Fatigue, unspecified type  R53.83 780.79 CBC WITH AUTOMATED DIFF      METABOLIC PANEL, COMPREHENSIVE      AMYLASE      LIPASE      CBC WITH AUTOMATED DIFF      METABOLIC PANEL, COMPREHENSIVE      AMYLASE      LIPASE   8. Type 2 diabetes mellitus without complication, with long-term current use of insulin (HCC)  E11.9 250.00     Z79.4 V58.67      Orders Placed This Encounter    XR CHEST PA LAT    CBC WITH AUTOMATED DIFF    METABOLIC PANEL, COMPREHENSIVE    AMYLASE    LIPASE    AMB POC EKG ROUTINE W/ 12 LEADS, INTER & REP    DISCONTD: omeprazole (PRILOSEC) 20 mg capsule    DISCONTD: pantoprazole (PROTONIX) 40 mg tablet    famotidine (PEPCID) 20 mg tablet    polyethylene glycol (MIRALAX) 17 gram packet    ondansetron hcl (ZOFRAN) 4 mg tablet     Per orders. Will check CXR and labs. Will report results when available.     lFora Rodrigez NP

## 2022-02-08 NOTE — PATIENT INSTRUCTIONS
Indigestion (Dyspepsia or Heartburn): Care Instructions  Your Care Instructions  Sometimes it can be hard to pinpoint the cause of indigestion. (It is also called dyspepsia or heartburn.) Most cases of an upset stomach with bloating, burning, burping, and nausea are minor and go away within several hours. Home treatment and over-the-counter medicine often are able to control symptoms. But if you take medicine to relieve your indigestion without making diet and lifestyle changes, your symptoms are likely to return again and again. If you get indigestion often, it may be a sign of a more serious medical problem. Be sure to follow up with your doctor, who may want to do tests to be sure of the cause of your indigestion. Follow-up care is a key part of your treatment and safety. Be sure to make and go to all appointments, and call your doctor if you are having problems. It's also a good idea to know your test results and keep a list of the medicines you take. How can you care for yourself at home? · Your doctor may recommend over-the-counter medicine. For mild or occasional indigestion, antacids such as Gaviscon, Mylanta, Maalox, or Tums, may help. Be safe with medicines. Be careful when you take over-the-counter antacid medicines. Many of these medicines have aspirin in them. Read the label to make sure that you are not taking more than the recommended dose. Too much aspirin can be harmful. · Your doctor also may recommend over-the-counter acid reducers, such as Pepcid AC (famotidine), Tagamet HB (cimetidine), or Prilosec (omeprazole). Read and follow all instructions on the label. If you use these medicines often, talk with your doctor. · Change your eating habits. ? It's best to eat several small meals instead of two or three large meals. ? After you eat, wait 2 to 3 hours before you lie down. ? Chocolate, mint, and alcohol can make GERD worse. ?  Spicy foods, foods that have a lot of acid (like tomatoes and oranges), and coffee can make GERD symptoms worse in some people. If your symptoms are worse after you eat a certain food, you may want to stop eating that food to see if your symptoms get better. · Do not smoke or chew tobacco. Smoking can make GERD worse. If you need help quitting, talk to your doctor about stop-smoking programs and medicines. These can increase your chances of quitting for good. · If you have GERD symptoms at night, raise the head of your bed 6 to 8 inches. You can do this by putting the frame on blocks or placing a foam wedge under the head of your mattress. (Adding extra pillows does not work.)  · Do not wear tight clothing around your middle. · Lose weight if you need to. Losing just 5 to 10 pounds can help. · Do not take anti-inflammatory medicines, such as aspirin, ibuprofen (Advil, Motrin), or naproxen (Aleve). These can irritate the stomach. If you need a pain medicine, try acetaminophen (Tylenol), which does not cause stomach upset. When should you call for help? Call your doctor now or seek immediate medical care if:    · You have new or worse belly pain.     · You are vomiting. Watch closely for changes in your health, and be sure to contact your doctor if:    · You have new or worse symptoms of indigestion.     · You have trouble or pain swallowing.     · You are losing weight.     · You do not get better as expected. Where can you learn more? Go to http://www.gray.com/  Enter [id number] in the search box to learn more about \"Indigestion (Dyspepsia or Heartburn): Care Instructions. \"  Current as of: February 10, 2021               Content Version: 13.0  © 4496-2040 [employer]. Care instructions adapted under license by [employer] (which disclaims liability or warranty for this information).  If you have questions about a medical condition or this instruction, always ask your healthcare professional. [employer] disclaims any warranty or liability for your use of this information.

## 2022-02-09 LAB
ALBUMIN SERPL-MCNC: 4.9 G/DL (ref 4–5)
ALBUMIN/GLOB SERPL: 1.8 {RATIO} (ref 1.2–2.2)
ALP SERPL-CCNC: 92 IU/L (ref 44–121)
ALT SERPL-CCNC: 24 IU/L (ref 0–44)
AMYLASE SERPL-CCNC: 50 U/L (ref 31–110)
AST SERPL-CCNC: 21 IU/L (ref 0–40)
BASOPHILS # BLD AUTO: 0.1 X10E3/UL (ref 0–0.2)
BASOPHILS NFR BLD AUTO: 1 %
BILIRUB SERPL-MCNC: 0.3 MG/DL (ref 0–1.2)
BUN SERPL-MCNC: 15 MG/DL (ref 6–20)
BUN/CREAT SERPL: 19 (ref 9–20)
CALCIUM SERPL-MCNC: 9.8 MG/DL (ref 8.7–10.2)
CHLORIDE SERPL-SCNC: 100 MMOL/L (ref 96–106)
CO2 SERPL-SCNC: 21 MMOL/L (ref 20–29)
CREAT SERPL-MCNC: 0.8 MG/DL (ref 0.76–1.27)
EOSINOPHIL # BLD AUTO: 0.5 X10E3/UL (ref 0–0.4)
EOSINOPHIL NFR BLD AUTO: 3 %
ERYTHROCYTE [DISTWIDTH] IN BLOOD BY AUTOMATED COUNT: 13.2 % (ref 11.6–15.4)
GLOBULIN SER CALC-MCNC: 2.7 G/DL (ref 1.5–4.5)
GLUCOSE SERPL-MCNC: 149 MG/DL (ref 65–99)
HCT VFR BLD AUTO: 49.5 % (ref 37.5–51)
HGB BLD-MCNC: 16.3 G/DL (ref 13–17.7)
IMM GRANULOCYTES # BLD AUTO: 0.1 X10E3/UL (ref 0–0.1)
IMM GRANULOCYTES NFR BLD AUTO: 1 %
LIPASE SERPL-CCNC: 19 U/L (ref 13–78)
LYMPHOCYTES # BLD AUTO: 2.6 X10E3/UL (ref 0.7–3.1)
LYMPHOCYTES NFR BLD AUTO: 15 %
MCH RBC QN AUTO: 30 PG (ref 26.6–33)
MCHC RBC AUTO-ENTMCNC: 32.9 G/DL (ref 31.5–35.7)
MCV RBC AUTO: 91 FL (ref 79–97)
MONOCYTES # BLD AUTO: 1.2 X10E3/UL (ref 0.1–0.9)
MONOCYTES NFR BLD AUTO: 7 %
NEUTROPHILS # BLD AUTO: 13.1 X10E3/UL (ref 1.4–7)
NEUTROPHILS NFR BLD AUTO: 73 %
PLATELET # BLD AUTO: 306 X10E3/UL (ref 150–450)
POTASSIUM SERPL-SCNC: 3.9 MMOL/L (ref 3.5–5.2)
PROT SERPL-MCNC: 7.6 G/DL (ref 6–8.5)
RBC # BLD AUTO: 5.43 X10E6/UL (ref 4.14–5.8)
SODIUM SERPL-SCNC: 140 MMOL/L (ref 134–144)
WBC # BLD AUTO: 17.5 X10E3/UL (ref 3.4–10.8)

## 2022-05-19 ENCOUNTER — OFFICE VISIT (OUTPATIENT)
Dept: ENDOCRINOLOGY | Age: 37
End: 2022-05-19
Payer: COMMERCIAL

## 2022-05-19 VITALS
HEIGHT: 69 IN | BODY MASS INDEX: 30.66 KG/M2 | HEART RATE: 92 BPM | SYSTOLIC BLOOD PRESSURE: 101 MMHG | WEIGHT: 207 LBS | DIASTOLIC BLOOD PRESSURE: 66 MMHG

## 2022-05-19 DIAGNOSIS — Z79.4 TYPE 2 DIABETES MELLITUS WITH HYPERGLYCEMIA, WITH LONG-TERM CURRENT USE OF INSULIN (HCC): Primary | ICD-10-CM

## 2022-05-19 DIAGNOSIS — E11.65 TYPE 2 DIABETES MELLITUS WITH HYPERGLYCEMIA, WITH LONG-TERM CURRENT USE OF INSULIN (HCC): Primary | ICD-10-CM

## 2022-05-19 DIAGNOSIS — E78.00 PURE HYPERCHOLESTEROLEMIA: ICD-10-CM

## 2022-05-19 LAB — HBA1C MFR BLD HPLC: 8.9 %

## 2022-05-19 PROCEDURE — 99214 OFFICE O/P EST MOD 30 MIN: CPT | Performed by: INTERNAL MEDICINE

## 2022-05-19 PROCEDURE — 83036 HEMOGLOBIN GLYCOSYLATED A1C: CPT | Performed by: INTERNAL MEDICINE

## 2022-05-19 PROCEDURE — 3052F HG A1C>EQUAL 8.0%<EQUAL 9.0%: CPT | Performed by: INTERNAL MEDICINE

## 2022-05-19 RX ORDER — INSULIN LISPRO 100 [IU]/ML
INJECTION, SOLUTION INTRAVENOUS; SUBCUTANEOUS
Qty: 15 ML | Refills: 3 | Status: SHIPPED | OUTPATIENT
Start: 2022-05-19 | End: 2022-05-31 | Stop reason: SDUPTHER

## 2022-05-19 RX ORDER — INSULIN GLARGINE 100 [IU]/ML
INJECTION, SOLUTION SUBCUTANEOUS
Qty: 30 ML | Refills: 5 | Status: SHIPPED | OUTPATIENT
Start: 2022-05-19 | End: 2022-06-22

## 2022-05-19 RX ORDER — PEN NEEDLE, DIABETIC 31 GX3/16"
NEEDLE, DISPOSABLE MISCELLANEOUS
Qty: 400 PEN NEEDLE | Refills: 3 | Status: SHIPPED | OUTPATIENT
Start: 2022-05-19

## 2022-05-19 NOTE — PROGRESS NOTES
Chief Complaint   Patient presents with    Diabetes     pcp and pharmacy verified   Records since last visit reviewed     History of Present Illness: Mally Staples. is a 29 y.o. male here for follow up of diabetes. Pt notes in January 2016 he was having issues of polyuria and polydipsia and was told by his friend, who is diabetic, was told he was probably diabetic as well. He notes that he checked his BG that day was 500. The next week he went to [facility] and his FGB was in the mid-200's and he was started on Metformin 500mg BID. A month later his FBG was 180's and his A1C was 10.6%. At our initial visit in March 2016 I tested pt for FRANK and Anti-insulin Ab, both of which were negative, his insulin and C-peptide levels were normal.    At our last visit in January 2022 his BGs were increasing so we increased his Basaglar to 35 units daily and I started him on Ozempic 0.5mg weekly. After starting the Ozempic he developed some nausea and constipation so he asked to stop the Ozempic. I told him to stop and to call when his symptoms resolved so we could try an alternative agent. Pt did not contact me so no alternative agents were started. \"My life has been horribly stressful, we have bought and sold two houses and we just put in an offer on a new house that was accepted. Also my diet has been worse, but not as bad as it used to be. \"    Pt denies any recent illnesses, injuries or hospitalizations. Pt has received both COVID vaccinations (Erling Nipper). He is taking Basaglar 35 units per day, Glipizide 10mg BID, Metformin 1000mg BID. His A1C today was 8.9%. Pt is checking his BGs once per week, typically in the morning. When he does check his BGs are in the 180-190 range. Pt is eating 2-3 meals daily. He wakes around 730AM.  He rarely eats breakfast and when he does it is typically a protein bar (8 carbs). He has lunch around 1-3P, yesterday he had a chicken wrap, cheese curds and diet soda. He generally will go for a walk at lunch time. \"There is a gym at my office and I have been going there. \"    His has dinner between 7-8PM, last night he had pork chops, mixed vegetables and diet soda. He has not been snacking typically. Pt adopted a puppy \"he is gigantic and he is good\". He has not been working on his [employer]". No history of vascular disease. No history of retinopathy, neuropathy, or nephropathy. Pt has hx of UC but not symptoms since the age of 17. Pt notes he was having more GI pain so he saw a gastroenterologist. He had an EGD and Colonoscopy \"they did not see any evidence of UC\". Pt has been diagnosed with SHARON but is not using CPAP. Last saw his eye doctor January 2020, \"everything looked good\". His LDL in February 2012 was 138 and his  and in October 2020 his LDL was 157. Pt is 35 y/o, his father has hx of CAD. We have discussed risks and most recent guidelines and research. He declined starting a statin. Current Outpatient Medications   Medication Sig    insulin glargine (Basaglar KwikPen U-100 Insulin) 100 unit/mL (3 mL) inpn INJECT 40 UNITS UNDER THE SKIN DAILY    insulin lispro (HUMALOG) 100 unit/mL kwikpen Take 5 units with each meal, plus correction, as instructed (Max daily dose of 30 units)    Insulin Needles, Disposable, (Martha Pen Needle) 32 gauge x 5/32\" ndle 4 shots daily    metFORMIN ER (GLUCOPHAGE XR) 500 mg tablet TAKE TWO TABLETS BY MOUTH EVERY MORNING AND TAKE TWO TABLETS BY MOUTH EVERY EVENING WITH DINNER    glipiZIDE (GLUCOTROL) 10 mg tablet TAKE ONE TABLET BY MOUTH EVERY MORNING AND TAKE ONE TABLET BY MOUTH EVERY EVENING AT DINNER    multivitamin (ONE A DAY) tablet Take 1 Tab by mouth daily.  cetirizine (ZYRTEC) 10 mg tablet Take 10-20 mg by mouth. No current facility-administered medications for this visit.      No Known Allergies  Review of Systems:  - Eyes: no blurry vision or double vision  - Cardiovascular: no chest pain  - Respiratory: no shortness of breath  - Musculoskeletal: no myalgias  - Neurological: no numbness/tingling in extremities    Physical Examination:  Blood pressure 101/66, pulse 92, height 5' 9\" (1.753 m), weight 207 lb (93.9 kg). - General: pleasant, no distress, good eye contact   - Neck: no carotid bruits  - Cardiovascular: regular, normal rate, nl s1 and s2, no m/r/g, 2+ DP pulses   - Respiratory: clear bilaterally  - Integumentary: no edema, no foot ulcers  - Psychiatric: normal mood and affect    Diabetic foot exam:     Left Foot:   Visual Exam: normal    Pulse DP: 2+ (normal)   Filament test: normal sensation    Vibratory sensation: normal      Right Foot:   Visual Exam: normal    Pulse DP: 2+ (normal)   Filament test: normal sensation    Vibratory sensation: normal        Data Reviewed:   His A1C today was 8.9%. Assessment/Plan:   1) DM > His A1C today was 8.9%. Because of the constipation he had with the Ozempic and his hx of UC he does not want to try another GLP-1 agent. Pt asked about starting meal time insulin so I increased his Basaglar to 40 units daily and started Humalog 5 units with each meal, plus 1:50 correction for BG >150. Pt to continue the Metformin XR 1000mg BID. Pt to test his BGs 4 times per day and send me BG readings in 6 weeks. We have discussed his insulin resistance and decreased pancreatic function. We have previously tested him for anti-insulin and anti-pancreactic Ab and they were negative. His c-peptide has been in then 2-3 range, though this has been in the face of Bgs in the 200's. His BP has consistently been at goal on no medications. 2) HLD > With the Keto diet his saturated fat intake was much higher. We discussed this in relation to his LDL. Pt is 29 y/o, his father has hx of CAD. We discussed risks and most recent guidelines and research. He has declined starting an anti-lipid agent in the past. Will repeat his lipid panel and CMP.     RTC 4 months    Pt voices understanding and agreement with the plan. Follow-up and Dispositions    · Return in about 4 months (around 9/19/2022).

## 2022-05-19 NOTE — PATIENT INSTRUCTIONS
1) Increase your Basaglar to 40 units every day. 2) Start Humalog 5 units with each meal. Plus a correction dose as needed for high blood sugars.     3) Humalog Correction  150-199 1 additional units  200-249 2 additional units  250-299 3 additional units  300-349 4 additional units  350-399 5 additional units  400-449 6 additional units  450-499 7 additional units  500-549 8 additional units  550+ 9 additional units

## 2022-05-19 NOTE — LETTER
5/19/2022    Patient: Jaida Martinez. YOB: 1985   Date of Visit: 5/19/2022     Elsa Zapata MD  [address]  [facility] [address]  [facility]    Dear Elsa Zapata MD,      Thank you for referring Mr. Joanne Navarro to [address] [place] [address] for evaluation. My notes for this consultation are attached. If you have questions, please do not hesitate to call me. I look forward to following your patient along with you.       Sincerely,    Mortimer Gails, MD

## 2022-05-31 RX ORDER — INSULIN LISPRO 100 [IU]/ML
INJECTION, SOLUTION INTRAVENOUS; SUBCUTANEOUS
Qty: 15 ML | Refills: 3 | Status: SHIPPED
Start: 2022-05-31 | End: 2022-05-31 | Stop reason: CLARIF

## 2022-05-31 RX ORDER — INSULIN ASPART 100 [IU]/ML
INJECTION, SOLUTION INTRAVENOUS; SUBCUTANEOUS
Qty: 15 ML | Refills: 3 | Status: SHIPPED | OUTPATIENT
Start: 2022-05-31

## 2022-05-31 NOTE — TELEPHONE ENCOUNTER
Lispro is not covered Novolog is covered. Per OV of 5/19/22 Humalog 5 units with each meal. Plus a correction dose as needed for high blood sugars. Notified patient via [address]

## 2022-07-01 NOTE — TELEPHONE ENCOUNTER
----- Message from Beatriz Love sent at 1/28/2021  4:15 PM EST -----  Regarding: Anupama [facility]ney  Contact: [phone]  Patient return call    Caller's first and last name and relationship (if not the patient):N/A      Best contact number(s):[phone]      Whose call is being returned: to r/s CPE      Message from [facility] [de-identified] : 18 year old male here for follow up. \par Currently with 5.0 trach with cap. Last changed 6/2022\par Reports cap stays on for most of the day. Reports using 2L oxygen at night. \par Mother reports ceiling collapsed on patient about 1 month ago and noticed instant nose bleed \par Reports spontaneous nosebleeds since then \par Last nose bleed about 2-3 weeks ago. Lasting less than 30 minutes. Nosebleeds resolved with applying pressure. \par Reports dried blood in nose \par Continues to use LONNIE saline gel with relief. \par Tolerating regular diet and Boost shakes to supplement \par Denies nasal congestion.

## 2022-08-15 ENCOUNTER — OFFICE VISIT (OUTPATIENT)
Dept: INTERNAL MEDICINE CLINIC | Age: 37
End: 2022-08-15
Payer: COMMERCIAL

## 2022-08-15 VITALS
RESPIRATION RATE: 16 BRPM | OXYGEN SATURATION: 96 % | TEMPERATURE: 98.3 F | WEIGHT: 214 LBS | SYSTOLIC BLOOD PRESSURE: 119 MMHG | HEIGHT: 69 IN | HEART RATE: 101 BPM | DIASTOLIC BLOOD PRESSURE: 86 MMHG | BODY MASS INDEX: 31.7 KG/M2

## 2022-08-15 DIAGNOSIS — Z00.00 ANNUAL PHYSICAL EXAM: Primary | ICD-10-CM

## 2022-08-15 PROCEDURE — 99395 PREV VISIT EST AGE 18-39: CPT | Performed by: INTERNAL MEDICINE

## 2022-08-15 NOTE — PROGRESS NOTES
Mr. Becca Joshi. is presenting to follow up     CC:  Physical and Rash       HPI:    30 yo male presenting for physical     Hx of ulcerative colitis  resolved during teenager years after taking azathioprine and steroids for years colonoscopy done and no evidence of ulcerative colitis but recommended repeat colonoscopy. I need records       Controlled type 2 diabetes mellitus without complication, with long-term current use of insulin ([facility]  - sees Dr Everton Draper, and tried ozempic but did not tolerate it  Currently on glargine and glipizide  1000mgBID and metformin 1000mg BID  Last HA1C 8.9   Patiente is considering jardiance          Idiopathic urticaria - on zyrtec which prevents hives        Working full time  In a relationship with girlfriend    Review of systems:  Constitutional: negative for fever, chills, weight loss, night sweats   Eyes : negative for vision changes, eye pain and discharge  Nose and Throat: negative for tinnitus, sore throat   Cardiovascular: negative for chest pain, palpitations and shortness of breath  Respiratory: negative for shortness of breath, cough and wheezing   Gastroinstestinal: negative for abdominal pain, nausea, vomiting, diarrhea, constipation, and blood in the stool  Musculoskeletal: negative for back ache and joint ache   Genitourinary: negative for dysuria, nocturia, polyuria and hematuria   Neurologic: Negative for focal weakness, numbness or incoordination  Skin: reports rash on back for years   Hematologic: negative for easy bruising      Past Medical History:   Diagnosis Date    Diabetes ([facility]     Gastrointestinal disorder     ulcerative colitis ?     Idiopathic urticaria     Sleep disorder     apnea    UC (ulcerative colitis) ([facility]     resolved per patient after azathioprine during childhood        Past Surgical History:   Procedure Laterality Date    HX CYST REMOVAL      HX OTHER SURGICAL      pylonidal cyst       No Known Allergies    Current Outpatient Medications on File Prior to Visit   Medication Sig Dispense Refill    insulin glargine (Basaglar KwikPen U-100 Insulin) 100 unit/mL (3 mL) inpn INJECT 40 UNITS UNDER THE SKIN DAILY 30 mL 3    insulin aspart U-100 (NOVOLOG) 100 unit/mL (3 mL) inpn Inject 5 units with each meal under the skin. Plus a correction dose as needed for high blood sugars. Max dose 30 units per day (formulary change) 15 mL 3    Insulin Needles, Disposable, (Martha Pen Needle) 32 gauge x 5/32\" ndle 4 shots daily 400 Pen Needle 3    metFORMIN ER (GLUCOPHAGE XR) 500 mg tablet TAKE TWO TABLETS BY MOUTH EVERY MORNING AND TAKE TWO TABLETS BY MOUTH EVERY EVENING WITH DINNER 360 Tablet 3    glipiZIDE (GLUCOTROL) 10 mg tablet TAKE ONE TABLET BY MOUTH EVERY MORNING AND TAKE ONE TABLET BY MOUTH EVERY EVENING AT DINNER 180 Tablet 3    multivitamin (ONE A DAY) tablet Take 1 Tab by mouth daily. cetirizine (ZYRTEC) 10 mg tablet Take 10-20 mg by mouth. No current facility-administered medications on file prior to visit. family history includes Heart Attack in his father and paternal grandfather; Hypertension in his father.     Social History     Socioeconomic History    Marital status: SINGLE     Spouse name: Not on file    Number of children: Not on file    Years of education: Not on file    Highest education level: Not on file   Occupational History    Not on file   Tobacco Use    Smoking status: Never    Smokeless tobacco: Never   Vaping Use    Vaping Use: Never used   Substance and Sexual Activity    Alcohol use: No     Alcohol/week: 0.0 standard drinks     Comment: rare    Drug use: No    Sexual activity: Yes   Other Topics Concern    Not on file   Social History Narrative    Not on file     Social Determinants of Health     Financial Resource Strain: Not on file   Food Insecurity: Not on file   Transportation Needs: Not on file   Physical Activity: Not on file   Stress: Not on file   Social Connections: Not on file   Intimate Partner Violence: Not on file   Housing Stability: Not on file       Visit Vitals  /86   Pulse (!) 101   Temp 98.3 °F (36.8 °C) (Temporal)   Resp 16   Ht 5' 9\" (1.753 m)   Wt 214 lb (97.1 kg)   SpO2 96%   BMI 31.60 kg/m²     General:  Well appearing male no acute distress  HEENT:   PERRL,normal conjunctiva. External ear and canals normal, TMs normal.  Hearing normal to voice. Nose without edema or discharge, normal septum. Lips, teeth, gums normal.  Oropharynx: no erythema, no exudates, no lesions, normal tongue. Neck:  Supple. Thyroid normal size, nontender, without nodules. No carotid bruit. No masses or lymphadenopathy  Respiratory: no respiratory distress,  no wheezing, no rhonchi, no rales. No chest wall tenderness. Cardiovascular:  RRR, normal S1S2, no murmur. Gastrointestinal: normal bowel sounds, soft, nontender, without masses. No hepatosplenomegaly. Extremities +2 pulses, no edema, normal sensation   Musculoskeletal:  Normal gait. Normal digits and nails. Normal strength and tone, no atrophy, and no abnormal movement. Skin:  back with several light tan and brown lesions that are scaly and slightly raised   Also has striae  Neuro:  A and OX4, fluent speech, cranial nerves normal 2-12. Sensation normal to light touch.  Psych:  Normal affect      Lab Results   Component Value Date/Time    WBC 17.5 (H) 02/07/2022 12:00 AM    HGB 16.3 02/07/2022 12:00 AM    HCT 49.5 02/07/2022 12:00 AM    PLATELET 814 28/04/6319 12:00 AM    MCV 91 02/07/2022 12:00 AM     Lab Results   Component Value Date/Time    Sodium 138 05/19/2022 08:35 AM    Potassium 4.2 05/19/2022 08:35 AM    Chloride 104 05/19/2022 08:35 AM    CO2 27 05/19/2022 08:35 AM    Anion gap 7 05/19/2022 08:35 AM    Glucose 172 (H) 05/19/2022 08:35 AM    BUN 16 05/19/2022 08:35 AM    Creatinine 0.76 05/19/2022 08:35 AM    BUN/Creatinine ratio 21 (H) 05/19/2022 08:35 AM    GFR est AA >60 05/19/2022 08:35 AM    GFR est non-AA >60 05/19/2022 08:35 AM    Calcium 9.7 05/19/2022 08:35 AM     Lab Results   Component Value Date/Time    Cholesterol, total 211 (H) 05/19/2022 08:35 AM    HDL Cholesterol 36 05/19/2022 08:35 AM    LDL,Direct 141 (H) 10/11/2016 02:08 PM    LDL, calculated 137.2 (H) 05/19/2022 08:35 AM    VLDL, calculated 37.8 05/19/2022 08:35 AM    Triglyceride 189 (H) 05/19/2022 08:35 AM    CHOL/HDL Ratio 5.9 (H) 05/19/2022 08:35 AM     Lab Results   Component Value Date/Time    TSH 1.170 03/19/2020 09:11 AM     Lab Results   Component Value Date/Time    Hemoglobin A1c 8.9 (H) 05/19/2021 09:53 AM    Hemoglobin A1c (POC) 8.9 05/19/2022 08:00 AM    Hemoglobin A1c, External 6.8 06/09/2016 12:00 AM     No results found for: Iam Cheese, XQVID3, XQVID, VD3RIA                Assessment and Plan:      Annual physical exam: Counseled on healthy diet regular exercise    Seborrhea keratosis on back- reassured patient      Hx of ulcerative colitis  resolved during teenager years after taking azathioprine and steroids for years colonoscopy done and no evidence of ulcerative colitis but recommended repeat colonoscopy.  I need records       Controlled type 2 diabetes mellitus without complication, with long-term current use of insulin ([address]  - sees Dr Jill Davis, and tried ozempic but did not tolerate it  Currently on glargine and glipizide  1000mgBID and metformin 1000mg BID  Last HA1C 8.9   Patiente is considering jardiance          Idiopathic urticaria - on zyrtec which prevents hibes      Jimmie Thacker MD

## 2022-08-15 NOTE — PROGRESS NOTES
1. \"Have you been to the ER, urgent care clinic since your last visit? Hospitalized since your last visit? \" Yes [facility]    2. \"Have you seen or consulted any other health care providers outside of the [address] [place] [address] since your last visit? \" No     3. For patients aged 39-70: Has the patient had a colonoscopy / FIT/ Cologuard? NA - based on age      If the patient is female:    4. For patients aged 41-77: Has the patient had a mammogram within the past 2 years? NA - based on age or sex      11. For patients aged 21-65: Has the patient had a pap smear?  NA - based on age or sex

## 2022-10-13 ENCOUNTER — OFFICE VISIT (OUTPATIENT)
Dept: ENDOCRINOLOGY | Age: 37
End: 2022-10-13
Payer: COMMERCIAL

## 2022-10-13 VITALS
HEIGHT: 69 IN | BODY MASS INDEX: 32.38 KG/M2 | WEIGHT: 218.6 LBS | DIASTOLIC BLOOD PRESSURE: 83 MMHG | SYSTOLIC BLOOD PRESSURE: 116 MMHG | HEART RATE: 97 BPM

## 2022-10-13 DIAGNOSIS — Z79.4 TYPE 2 DIABETES MELLITUS WITH HYPERGLYCEMIA, WITH LONG-TERM CURRENT USE OF INSULIN (HCC): Primary | ICD-10-CM

## 2022-10-13 DIAGNOSIS — E78.00 PURE HYPERCHOLESTEROLEMIA: ICD-10-CM

## 2022-10-13 DIAGNOSIS — E11.65 TYPE 2 DIABETES MELLITUS WITH HYPERGLYCEMIA, WITH LONG-TERM CURRENT USE OF INSULIN (HCC): Primary | ICD-10-CM

## 2022-10-13 LAB — HBA1C MFR BLD HPLC: 9.8 %

## 2022-10-13 PROCEDURE — 83036 HEMOGLOBIN GLYCOSYLATED A1C: CPT | Performed by: INTERNAL MEDICINE

## 2022-10-13 PROCEDURE — 3046F HEMOGLOBIN A1C LEVEL >9.0%: CPT | Performed by: INTERNAL MEDICINE

## 2022-10-13 PROCEDURE — 99214 OFFICE O/P EST MOD 30 MIN: CPT | Performed by: INTERNAL MEDICINE

## 2022-10-13 RX ORDER — TIRZEPATIDE 2.5 MG/.5ML
2.5 INJECTION, SOLUTION SUBCUTANEOUS
Qty: 4 PEN | Refills: 0 | Status: SHIPPED | COMMUNITY
Start: 2022-10-13

## 2022-10-13 NOTE — LETTER
10/13/2022    Patient: Tray Gutierrez. YOB: 1985   Date of Visit: 10/13/2022     MD Michael  [address] [facility] [address]  [facility]    Dear MD Michael,      Thank you for referring Mr. Karan Abdi to [address] [place] [address] for evaluation. My notes for this consultation are attached. If you have questions, please do not hesitate to call me. I look forward to following your patient along with you.       Sincerely,    Al Zaldivar MD

## 2022-10-13 NOTE — PROGRESS NOTES
Chief Complaint   Patient presents with    Diabetes     PCP and Pharmacy verified     Records since last visit reviewed     History of Present Illness: Ana Horvath. is a 29 y.o. male here for follow up of diabetes. Pt notes in January 2016 he was having issues of polyuria and polydipsia and was told by his friend, who is diabetic, was told he was probably diabetic as well. He notes that he checked his BG that day was 500. The [facility] and his FGB was in the mid-200's and he was started on Metformin 500mg BID. A month later his FBG was 180's and his A1C was 10.6%. At our initial visit in March 2016 I tested pt for FRANK and Anti-insulin Ab, both of which were negative, his insulin and C-peptide levels were normal.    At our last visit in May 2022 his A1C was 8.9%. Because of the constipation he had with the Ozempic and his hx of UC he did not want to try another GLP-1 agent. Pt asked about starting meal time insulin so I increased his Basaglar to 40 units daily and started Humalog 5 units with each meal, plus 1:50 correction for BG >150. Pt to continue the Metformin XR 1000mg BID. \"I moved to a new house, we have land and have been planting grass. Today is my last day at my old job and I am starting a new job soon. I am moving from the [place] [address] to the  department. \"  \"I have not been good about taking the insulin as I should, I am taking it with dinner, but not with lunch and I don't typically eat breakfast. \"    His A1C today has increased to 9.8%    Pt is taking Basaglar 40 units HS and the Humalog 5 units with dinner. He is taking the Glipizide in the morning and with dinner. Pt denies any recent illnesses, injuries or hospitalizations. Pt has received both COVID vaccinations ([place]). Pt is checking his BGs once per week, typically before dinner. When he does check his BGs,  are in the 200-300s range. He has not had issues of hypoglycemia.     Pt is eating 2-3 meals daily. He wakes around 7AM.  He rarely eats breakfast and when he does it is typically a protein bar (8 carbs). He has lunch around 1-3PM, yesterday he had a chicken salad wrap (40 carbs) and diet soda. He generally will go for a walk at lunch time. His has dinner between 8PM, last night he had fried chicken tenders, air fried broccoli and diet soda. He has not been snacking typically. \"I will occasionally have a KETO snack. \"    Pt adopted a puppy \"he is gigantic and he is good\". No history of vascular disease. No history of retinopathy, neuropathy, or nephropathy. Pt has hx of UC but not symptoms since the age of 17. Pt notes he was having more GI pain so he saw a gastroenterologist.     Pt has been diagnosed with SHARON but is not using CPAP. Last saw his eye doctor January 2020, \"everything looked good\". His LDL in February 2012 was 138 and his  and in October 2020 his LDL was 157. Pt is 35 y/o, his father has hx of CAD. We have discussed risks and most recent guidelines and research. He declined starting a statin. Current Outpatient Medications   Medication Sig    tirzepatide (Mounjaro) 2.5 mg/0.5 mL pnij 2.5 mg by SubCUTAneous route every seven (7) days. insulin glargine (Basaglar KwikPen U-100 Insulin) 100 unit/mL (3 mL) inpn INJECT 40 UNITS UNDER THE SKIN DAILY    insulin aspart U-100 (NOVOLOG) 100 unit/mL (3 mL) inpn Inject 5 units with each meal under the skin. Plus a correction dose as needed for high blood sugars.  Max dose 30 units per day (formulary change)    Insulin Needles, Disposable, (Martha Pen Needle) 32 gauge x 5/32\" ndle 4 shots daily    metFORMIN ER (GLUCOPHAGE XR) 500 mg tablet TAKE TWO TABLETS BY MOUTH EVERY MORNING AND TAKE TWO TABLETS BY MOUTH EVERY EVENING WITH DINNER    glipiZIDE (GLUCOTROL) 10 mg tablet TAKE ONE TABLET BY MOUTH EVERY MORNING AND TAKE ONE TABLET BY MOUTH EVERY EVENING AT DINNER    multivitamin (ONE A DAY) tablet Take 1 Tab by mouth daily. cetirizine (ZYRTEC) 10 mg tablet Take 10-20 mg by mouth. No current facility-administered medications for this visit. No Known Allergies  Review of Systems:  - Eyes: no blurry vision or double vision  - Cardiovascular: no chest pain  - Respiratory: no shortness of breath  - Musculoskeletal: no myalgias  - Neurological: no numbness/tingling in extremities    Physical Examination:  Blood pressure 116/83, pulse 97, height 5' 9\" (1.753 m), weight 218 lb 9.6 oz (99.2 kg). General: pleasant, no distress, good eye contact   Neck: no carotid bruits  Cardiovascular: regular, normal rate, nl s1 and s2, no m/r/g, 2+ DP pulses   Respiratory: clear bilaterally  Integumentary: no edema, no foot ulcers  Psychiatric: normal mood and affect    Diabetic foot exam:     Left Foot:   Visual Exam: normal    Pulse DP: 2+ (normal)   Filament test: normal sensation    Vibratory sensation: normal      Right Foot:   Visual Exam: normal    Pulse DP: 2+ (normal)   Filament test: normal sensation    Vibratory sensation: normal        Data Reviewed:   His A1C today was 9.8%. Assessment/Plan:   1) DM > His A1C today was 9.8%. Pt asked about starting Mounjaro. I will give him a sample of the 2.5mg weekly dose and if he tolerates this we will continue to 5mg weekly. Pt to continue the Basaglar 40 units HS and increase his Humalog to 10 units with lunch and 10 units with dinner. Pt to bring a Humalog pen and keep it in his desk at work. Pt to continue the Metformin XR 1000mg BID and Glipizide 10mg BID for now. His BP has consistently been at goal on no medications. 2) HLD > With the Keto diet his saturated fat intake was much higher. We discussed this in relation to his LDL. Pt is 31 y/o, his father has hx of CAD. We discussed risks and most recent guidelines and research. He has declined starting an anti-lipid agent.     RTC 4 months    Pt voices understanding and agreement with the plan.      Follow-up and Dispositions    Return in about 4 months (around 2/13/2023).

## 2022-11-14 ENCOUNTER — PATIENT MESSAGE (OUTPATIENT)
Dept: INTERNAL MEDICINE CLINIC | Age: 37
End: 2022-11-14

## 2022-11-14 DIAGNOSIS — R53.83 FATIGUE, UNSPECIFIED TYPE: Primary | ICD-10-CM

## 2022-11-15 NOTE — TELEPHONE ENCOUNTER
Chula Callahan 11/15/2022 7:43 AM EST      ----- Message -----  From: Ella Townsend. Sent: 11/14/2022 6:31 PM EST  To: [address] [facility]  Subject: Leonardo Edge,    I went down an internet rabbit hole regarding magnesium/magnesium deficiencies and how this can affect cognition. It seems that there is also a correlation between lower magnesium levels and insulin resistance. Looking at my previous blood work, Im not seeing that magnesium levels have never been tested. Do you think that is something worth looking into? And do you think it would be ok to take a magnesium supplement? Also, I found this while looking up information about ADD. Samanta never been diagnosed with ADD, but I feel that I may be struggling with related symptoms. Is this sometime that I could discuss with you or would I need to find a specialist?      Thanks!

## 2022-12-03 LAB — MAGNESIUM SERPL-MCNC: 2 MG/DL (ref 1.6–2.3)

## 2023-02-07 RX ORDER — METFORMIN HYDROCHLORIDE 500 MG/1
TABLET, EXTENDED RELEASE ORAL
Qty: 360 TABLET | Refills: 3 | Status: SHIPPED | OUTPATIENT
Start: 2023-02-07

## 2023-02-07 RX ORDER — INSULIN ASPART 100 [IU]/ML
INJECTION, SOLUTION INTRAVENOUS; SUBCUTANEOUS
Qty: 15 ML | Refills: 5 | Status: SHIPPED | OUTPATIENT
Start: 2023-02-07

## 2023-02-07 RX ORDER — GLIPIZIDE 10 MG/1
TABLET ORAL
Qty: 180 TABLET | Refills: 3 | Status: SHIPPED | OUTPATIENT
Start: 2023-02-07

## 2023-02-15 ENCOUNTER — OFFICE VISIT (OUTPATIENT)
Dept: ENDOCRINOLOGY | Age: 38
End: 2023-02-15
Payer: COMMERCIAL

## 2023-02-15 VITALS
BODY MASS INDEX: 32.61 KG/M2 | DIASTOLIC BLOOD PRESSURE: 77 MMHG | WEIGHT: 220.2 LBS | HEART RATE: 95 BPM | SYSTOLIC BLOOD PRESSURE: 109 MMHG | HEIGHT: 69 IN

## 2023-02-15 DIAGNOSIS — Z79.4 TYPE 2 DIABETES MELLITUS WITH HYPERGLYCEMIA, WITH LONG-TERM CURRENT USE OF INSULIN (HCC): Primary | ICD-10-CM

## 2023-02-15 DIAGNOSIS — E78.00 PURE HYPERCHOLESTEROLEMIA: ICD-10-CM

## 2023-02-15 DIAGNOSIS — E11.65 TYPE 2 DIABETES MELLITUS WITH HYPERGLYCEMIA, WITH LONG-TERM CURRENT USE OF INSULIN (HCC): Primary | ICD-10-CM

## 2023-02-15 LAB — HBA1C MFR BLD HPLC: 9.7 %

## 2023-02-15 PROCEDURE — 83036 HEMOGLOBIN GLYCOSYLATED A1C: CPT | Performed by: INTERNAL MEDICINE

## 2023-02-15 PROCEDURE — 3046F HEMOGLOBIN A1C LEVEL >9.0%: CPT | Performed by: INTERNAL MEDICINE

## 2023-02-15 PROCEDURE — 99214 OFFICE O/P EST MOD 30 MIN: CPT | Performed by: INTERNAL MEDICINE

## 2023-02-15 RX ORDER — INSULIN ASPART 100 [IU]/ML
INJECTION, SOLUTION INTRAVENOUS; SUBCUTANEOUS
Qty: 30 ML | Refills: 5 | Status: SHIPPED | OUTPATIENT
Start: 2023-02-15

## 2023-02-15 RX ORDER — INSULIN GLARGINE 100 [IU]/ML
INJECTION, SOLUTION SUBCUTANEOUS
Qty: 30 ML | Refills: 3 | Status: SHIPPED | OUTPATIENT
Start: 2023-02-15

## 2023-02-15 NOTE — PATIENT INSTRUCTIONS
1) Increase your Basaglar to 50 units every night. 2) Increase your Humalog to 20 units with lunch and 20 units with dinner plus correction for high sugars. 150-199 1 additional units  200-249 2 additional units  250-299 3 additional units  300-349 4 additional units  350-399 5 additional units  400-449 6 additional units  450-499 7 additional units  500-549 8 additional units  550+ 9 additional units    3) Continue the Metformin two pills in the morning and two pills with dinner. 4) Stop the Glipizide. 5) Let me know if you decide to try the [facility].

## 2023-02-15 NOTE — LETTER
2/15/2023    Patient: Royal Smith. YOB: 1985   Date of Visit: 2/15/2023     Ernesto Nickerson MD  [address]  [facility] [address]  [facility]    Dear Ernesto Nickerson MD,      Thank you for referring Mr. Toby Black to [address] [place] [address] for evaluation. My notes for this consultation are attached. If you have questions, please do not hesitate to call me. I look forward to following your patient along with you.       Sincerely,    Brad Caldera MD

## 2023-02-15 NOTE — PROGRESS NOTES
Chief Complaint   Patient presents with    Diabetes     Pcp and pharmacy verified       Records since last visit reviewed     History of Present Illness: Bibiana Clancy is a 40 y.o. male here for follow up of diabetes. Pt notes in January 2016 he was having issues of polyuria and polydipsia and was told by his friend, who is diabetic, was told he was probably diabetic as well. He notes that he checked his BG that day was 500. [facility] and his FGB was in the mid-200's and he was started on Metformin 500mg BID. A month later his FBG was 180's and his A1C was 10.6%. At our initial visit in March 2016 I tested pt for FRANK and Anti-insulin Ab, both of which were negative, his insulin and C-peptide levels were normal.    At our visit in May 2022 his A1C was 8.9%. Because of the constipation he had with the Ozempic. Pt asked about starting meal time insulin so I increased his Basaglar to 40 units daily and started Humalog 5 units with each meal, plus 1:50 correction for BG >150. Pt to continue the Metformin XR 1000mg BID. At our last visit in October 2022 his A1C was 9.8% and pt asked about starting Mounjaro and I started him on the 2.5mg weekly dose since he had some troubles with Ozempic. I also increased his Humalog to 10 units with lunch and dinner. Since our last visit in November he was started on a steroid eye drop for episcleritis. He notes the episcleritis has improved. \"I had a DM eye exam in November and he said he did not see any troubles related to DM. \"    \"I never took the [facility], I read the PI and I was concerned about the findings of tumors seen in rats\". Pt Notes he has been taking Novolog 15 units with meal and Basaglar 40 units HS and Metformin XR 1000mg BID. He is also taking the Glipizide 10mg BID. He is not testing his BGs on a regular basis. His A1C today was 9.7%. Pt notes his new house is \"coming along slowly\".  He notes his new job in the [address] is going well as well. \"    Pt notes he has started to have some tingling in his feet \"every once in awhile\". Pt is eating 2 meals daily. He wakes around 7AM.  He rarely eats breakfast.   He has lunch around \"Noonish\", yesterday he had a chicken wrap, cheese sticks and water. He generally will go for a walk at lunch time. His has dinner between 7PM, last night he had a large fried chicken tenders and diet soda. He has not been snacking typically. Pt adopted a puppy \"he is gigantic and he is good\". No history of vascular disease. No history of retinopathy, neuropathy, or nephropathy. Pt has hx of UC but not symptoms since the age of 17. Pt notes he was having more GI pain so he saw a gastroenterologist.     Pt has been diagnosed with SHARON but is not using CPAP. Last saw his eye doctor in November 2022. His LDL in February 2022 was 138 and his  and in October 2020 his LDL was 157. Pt is 37 y/o, his father has hx of CAD. We have discussed risks and most recent guidelines and research. He declined starting a statin. Current Outpatient Medications   Medication Sig    insulin aspart U-100 (NovoLOG FlexPen U-100 Insulin) 100 unit/mL (3 mL) inpn 20 units with each meal, plus correction. Max daily dose of 80 units    insulin glargine (Basaglar KwikPen U-100 Insulin) 100 unit/mL (3 mL) inpn INJECT 50 UNITS UNDER THE SKIN DAILY    metFORMIN ER (GLUCOPHAGE XR) 500 mg tablet TAKE TWO TABLETS BY MOUTH EVERY MORNING AND TAKE TWO TABLETS BY MOUTH EVERY EVENING WITH DINNER    Insulin Needles, Disposable, (Martha Pen Needle) 32 gauge x 5/32\" ndle 4 shots daily    multivitamin (ONE A DAY) tablet Take 1 Tab by mouth daily. cetirizine (ZYRTEC) 10 mg tablet Take 10-20 mg by mouth. No current facility-administered medications for this visit.      No Known Allergies  Review of Systems:  - Eyes: no blurry vision or double vision  - Cardiovascular: no chest pain  - Respiratory: no shortness of breath  - Musculoskeletal: no myalgias  - Neurological: occasional numbness/tingling in extremities    Physical Examination:  Blood pressure 109/77, pulse 95, height 5' 9\" (1.753 m), weight 220 lb 3.2 oz (99.9 kg). General: pleasant, no distress, good eye contact   Neck: no carotid bruits  Cardiovascular: regular, normal rate, nl s1 and s2, no m/r/g, 2+ DP pulses   Respiratory: clear bilaterally  Integumentary: no edema, no foot ulcers  Psychiatric: normal mood and affect    Diabetic foot exam:     Left Foot:   Visual Exam: normal    Pulse DP: 2+ (normal)   Filament test: normal sensation    Vibratory sensation: normal      Right Foot:   Visual Exam: normal    Pulse DP: 2+ (normal)   Filament test: normal sensation    Vibratory sensation: normal        Data Reviewed:   His A1C today was 9.7%. Assessment/Plan:   1) DM > His A1C today was 9.7%. For now pt to increase the Basaglar to 50 units daily and increase his Humalog to 20 units with breakfast and 20 units with lunch. Pt to continue the Metformin XR 1000mg BID, but stop the Glipzide. Pt to consider starting the Mounjaro 2.5mg weekly. His BP has consistently been at goal on no medications. 2) HLD > With the Keto diet his saturated fat intake was much higher. We discussed this in relation to his LDL. Pt is 29 y/o, his father has hx of CAD. We discussed risks and most recent guidelines and research. He has declined starting an anti-lipid agent. RTC 4 months    Pt voices understanding and agreement with the plan. Follow-up and Dispositions    Return in about 3 months (around 5/15/2023).

## 2023-03-16 RX ORDER — TIRZEPATIDE 5 MG/.5ML
5 INJECTION, SOLUTION SUBCUTANEOUS
Qty: 4 PEN | Refills: 0 | Status: SHIPPED | OUTPATIENT
Start: 2023-03-16

## 2023-04-22 DIAGNOSIS — Z79.4 TYPE 2 DIABETES MELLITUS WITH HYPERGLYCEMIA, WITH LONG-TERM CURRENT USE OF INSULIN (HCC): Primary | ICD-10-CM

## 2023-04-22 DIAGNOSIS — E11.65 TYPE 2 DIABETES MELLITUS WITH HYPERGLYCEMIA, WITH LONG-TERM CURRENT USE OF INSULIN (HCC): Primary | ICD-10-CM

## 2023-04-23 DIAGNOSIS — E78.00 PURE HYPERCHOLESTEROLEMIA: ICD-10-CM

## 2023-04-23 DIAGNOSIS — Z79.4 TYPE 2 DIABETES MELLITUS WITH HYPERGLYCEMIA, WITH LONG-TERM CURRENT USE OF INSULIN (HCC): Primary | ICD-10-CM

## 2023-04-23 DIAGNOSIS — E11.65 TYPE 2 DIABETES MELLITUS WITH HYPERGLYCEMIA, WITH LONG-TERM CURRENT USE OF INSULIN (HCC): Primary | ICD-10-CM

## 2023-04-24 DIAGNOSIS — E11.65 TYPE 2 DIABETES MELLITUS WITH HYPERGLYCEMIA, WITH LONG-TERM CURRENT USE OF INSULIN (HCC): Primary | ICD-10-CM

## 2023-04-24 DIAGNOSIS — E78.00 PURE HYPERCHOLESTEROLEMIA: ICD-10-CM

## 2023-04-24 DIAGNOSIS — Z79.4 TYPE 2 DIABETES MELLITUS WITH HYPERGLYCEMIA, WITH LONG-TERM CURRENT USE OF INSULIN (HCC): Primary | ICD-10-CM

## 2023-05-01 DIAGNOSIS — E11.65 TYPE 2 DIABETES MELLITUS WITH HYPERGLYCEMIA, WITH LONG-TERM CURRENT USE OF INSULIN (HCC): ICD-10-CM

## 2023-05-01 DIAGNOSIS — E78.00 PURE HYPERCHOLESTEROLEMIA: ICD-10-CM

## 2023-05-01 DIAGNOSIS — Z79.4 TYPE 2 DIABETES MELLITUS WITH HYPERGLYCEMIA, WITH LONG-TERM CURRENT USE OF INSULIN (HCC): ICD-10-CM

## 2023-05-08 ENCOUNTER — OFFICE VISIT (OUTPATIENT)
Age: 38
End: 2023-05-08

## 2023-05-08 VITALS
TEMPERATURE: 98.2 F | RESPIRATION RATE: 16 BRPM | HEART RATE: 99 BPM | WEIGHT: 220 LBS | OXYGEN SATURATION: 98 % | BODY MASS INDEX: 32.58 KG/M2 | DIASTOLIC BLOOD PRESSURE: 76 MMHG | HEIGHT: 69 IN | SYSTOLIC BLOOD PRESSURE: 114 MMHG

## 2023-05-08 DIAGNOSIS — J02.9 SORE THROAT: Primary | ICD-10-CM

## 2023-05-08 LAB
GROUP A STREP ANTIGEN, POC: NEGATIVE
VALID INTERNAL CONTROL, POC: NORMAL

## 2023-05-08 RX ORDER — METFORMIN HYDROCHLORIDE 500 MG/1
TABLET, EXTENDED RELEASE ORAL
COMMUNITY
Start: 2023-03-08

## 2023-05-08 RX ORDER — TIRZEPATIDE 5 MG/.5ML
INJECTION, SOLUTION SUBCUTANEOUS
COMMUNITY
Start: 2023-03-20 | End: 2023-05-11

## 2023-05-08 RX ORDER — INSULIN GLARGINE 100 [IU]/ML
INJECTION, SOLUTION SUBCUTANEOUS
COMMUNITY
Start: 2023-03-08

## 2023-05-08 RX ORDER — INSULIN ASPART 100 [IU]/ML
INJECTION, SOLUTION INTRAVENOUS; SUBCUTANEOUS
COMMUNITY
Start: 2023-02-16

## 2023-05-08 ASSESSMENT — ENCOUNTER SYMPTOMS: SORE THROAT: 1

## 2023-05-08 NOTE — PROGRESS NOTES
Vitals:    05/08/23 1559   BP: 114/76   Pulse: 99   Resp: 16   Temp: 98.2 °F (36.8 °C)   SpO2: 98%     Seen for strep throat exposure.
it's own. Recommended rest, push fluids, and take tylenol or ibuprofen for fever / symptom relief as needed.          NAE Ruffin - NP

## 2023-05-11 RX ORDER — TIRZEPATIDE 5 MG/.5ML
INJECTION, SOLUTION SUBCUTANEOUS
Qty: 4 ML | Refills: 0 | Status: SHIPPED | OUTPATIENT
Start: 2023-05-11

## 2023-05-19 ENCOUNTER — OFFICE VISIT (OUTPATIENT)
Age: 38
End: 2023-05-19
Payer: COMMERCIAL

## 2023-05-19 VITALS
HEART RATE: 89 BPM | DIASTOLIC BLOOD PRESSURE: 75 MMHG | WEIGHT: 215.4 LBS | HEIGHT: 69 IN | BODY MASS INDEX: 31.9 KG/M2 | SYSTOLIC BLOOD PRESSURE: 103 MMHG

## 2023-05-19 DIAGNOSIS — E11.65 TYPE 2 DIABETES MELLITUS WITH HYPERGLYCEMIA, WITH LONG-TERM CURRENT USE OF INSULIN (HCC): ICD-10-CM

## 2023-05-19 DIAGNOSIS — E11.65 TYPE 2 DIABETES MELLITUS WITH HYPERGLYCEMIA, WITH LONG-TERM CURRENT USE OF INSULIN (HCC): Primary | ICD-10-CM

## 2023-05-19 DIAGNOSIS — E78.00 PURE HYPERCHOLESTEROLEMIA, UNSPECIFIED: ICD-10-CM

## 2023-05-19 DIAGNOSIS — Z79.4 TYPE 2 DIABETES MELLITUS WITH HYPERGLYCEMIA, WITH LONG-TERM CURRENT USE OF INSULIN (HCC): ICD-10-CM

## 2023-05-19 DIAGNOSIS — Z79.4 TYPE 2 DIABETES MELLITUS WITH HYPERGLYCEMIA, WITH LONG-TERM CURRENT USE OF INSULIN (HCC): Primary | ICD-10-CM

## 2023-05-19 PROCEDURE — 99214 OFFICE O/P EST MOD 30 MIN: CPT | Performed by: INTERNAL MEDICINE

## 2023-05-19 NOTE — PROGRESS NOTES
Chief Complaint   Patient presents with    Diabetes     Pcp and pharmacy verified     History of Present Illness: Steffany Gonzáles. is a 40 y.o. male here for follow up of diabetes. In January 2016 he was having issues of polyuria and polydipsia and was told by his friend, who is diabetic, was told he was probably diabetic as well. He notes that he checked his BG that day was 500. [facility] and his FGB was in the mid-200's and he was started on Metformin 500mg BID. A month later his FBG was 180's and his A1C was 10.6%. At our initial visit in March 2016 I tested pt for NIKKIE and Anti-insulin Ab, both of which were negative, his insulin and C-peptide levels were normal.    At our visit in May 2022 his A1C was 8.9%. Because of the constipation he had with the Ozempic it was discontinued. Pt asked about starting meal time insulin so I increased his Basaglar to 40 units daily and started Humalog 5 units with each meal, plus 1:50 correction for BG >150. Pt to continue the Metformin XR 1000mg BID. At our last visit in October 2022 his A1C was 9.8% and pt asked about starting Mounjaro and I started him on the 2.5mg weekly dose since he had some troubles with Ozempic. I also increased his Humalog to 10 units with lunch and dinner. At our last visit in February 2023 his A1C was 9.7%. He was taking Basaglar 40 units HS, Novolog 15 units TID/AC and Metformin XR 1000mg BID. Pt was instructed to increase the Basaglar to 50 units daily and increase his Humalog to 20 units with breakfast and 20 units with lunch. Pt to continue the Metformin XR 1000mg BID, but stop the Glipzide. I again recommended he start the [facility]. \"We moved out to [place] in June and now I am building a chicken coop. I was going to the gym for a little while, but did not continue that. I still have the membership. \"     He notes his new job in the [address] [address] [place] is going well as well. \"    Pt denies any

## 2023-05-20 LAB
ALBUMIN SERPL-MCNC: 4.4 G/DL (ref 3.5–5)
ALBUMIN/GLOB SERPL: 1.2 (ref 1.1–2.2)
ALP SERPL-CCNC: 84 U/L (ref 45–117)
ALT SERPL-CCNC: 32 U/L (ref 12–78)
ANION GAP SERPL CALC-SCNC: 6 MMOL/L (ref 5–15)
AST SERPL-CCNC: 14 U/L (ref 15–37)
BILIRUB SERPL-MCNC: 0.4 MG/DL (ref 0.2–1)
BUN SERPL-MCNC: 10 MG/DL (ref 6–20)
BUN/CREAT SERPL: 12 (ref 12–20)
CALCIUM SERPL-MCNC: 9.7 MG/DL (ref 8.5–10.1)
CHLORIDE SERPL-SCNC: 102 MMOL/L (ref 97–108)
CHOLEST SERPL-MCNC: 191 MG/DL
CO2 SERPL-SCNC: 28 MMOL/L (ref 21–32)
CREAT SERPL-MCNC: 0.84 MG/DL (ref 0.7–1.3)
CREAT UR-MCNC: 255 MG/DL
EST. AVERAGE GLUCOSE BLD GHB EST-MCNC: 177 MG/DL
GLOBULIN SER CALC-MCNC: 3.6 G/DL (ref 2–4)
GLUCOSE SERPL-MCNC: 137 MG/DL (ref 65–100)
HBA1C MFR BLD: 7.8 % (ref 4–5.6)
HDLC SERPL-MCNC: 35 MG/DL
HDLC SERPL: 5.5 (ref 0–5)
LDLC SERPL CALC-MCNC: 123.8 MG/DL (ref 0–100)
LDLC SERPL DIRECT ASSAY-MCNC: 125 MG/DL (ref 0–100)
MICROALBUMIN UR-MCNC: 2.91 MG/DL
MICROALBUMIN/CREAT UR-RTO: 11 MG/G (ref 0–30)
POTASSIUM SERPL-SCNC: 3.9 MMOL/L (ref 3.5–5.1)
PROT SERPL-MCNC: 8 G/DL (ref 6.4–8.2)
SODIUM SERPL-SCNC: 136 MMOL/L (ref 136–145)
TRIGL SERPL-MCNC: 161 MG/DL
TSH SERPL DL<=0.05 MIU/L-ACNC: 1.09 UIU/ML (ref 0.36–3.74)
VLDLC SERPL CALC-MCNC: 32.2 MG/DL

## 2023-05-20 RX ORDER — INSULIN GLARGINE 100 [IU]/ML
INJECTION, SOLUTION SUBCUTANEOUS
COMMUNITY
Start: 2023-03-08

## 2023-05-20 RX ORDER — METFORMIN HYDROCHLORIDE 500 MG/1
TABLET, EXTENDED RELEASE ORAL
COMMUNITY
Start: 2023-02-07

## 2023-05-20 RX ORDER — CETIRIZINE HYDROCHLORIDE 10 MG/1
TABLET ORAL
COMMUNITY

## 2023-05-21 LAB — C PEPTIDE SERPL-MCNC: 1.2 NG/ML (ref 1.1–4.4)

## 2023-05-23 LAB — GAD65 AB SER-ACNC: <5 U/ML (ref 0–5)

## 2023-05-28 LAB — ISLET CELL512 AB SER-ACNC: <7.5 U/ML

## 2023-06-02 RX ORDER — TIRZEPATIDE 7.5 MG/.5ML
7.5 INJECTION, SOLUTION SUBCUTANEOUS WEEKLY
Qty: 2 ML | Refills: 0 | Status: SHIPPED | OUTPATIENT
Start: 2023-06-02

## 2023-06-05 LAB — ZNT8 AB: >500 U/ML

## 2023-06-29 RX ORDER — TIRZEPATIDE 7.5 MG/.5ML
INJECTION, SOLUTION SUBCUTANEOUS
Qty: 2 ML | Refills: 0 | Status: SHIPPED | OUTPATIENT
Start: 2023-06-29

## 2023-07-26 ENCOUNTER — OFFICE VISIT (OUTPATIENT)
Age: 38
End: 2023-07-26

## 2023-07-26 VITALS
RESPIRATION RATE: 16 BRPM | WEIGHT: 215 LBS | BODY MASS INDEX: 31.84 KG/M2 | TEMPERATURE: 97.8 F | HEIGHT: 69 IN | SYSTOLIC BLOOD PRESSURE: 115 MMHG | DIASTOLIC BLOOD PRESSURE: 84 MMHG | OXYGEN SATURATION: 97 % | HEART RATE: 93 BPM

## 2023-07-26 DIAGNOSIS — R42 DIZZINESS ON STANDING: ICD-10-CM

## 2023-07-26 DIAGNOSIS — H60.541 DERMATITIS OF RIGHT EAR CANAL: Primary | ICD-10-CM

## 2023-07-26 PROCEDURE — 99213 OFFICE O/P EST LOW 20 MIN: CPT

## 2023-08-01 RX ORDER — TIRZEPATIDE 7.5 MG/.5ML
INJECTION, SOLUTION SUBCUTANEOUS
Qty: 2 ML | Refills: 0 | OUTPATIENT
Start: 2023-08-01

## 2023-08-02 ENCOUNTER — OFFICE VISIT (OUTPATIENT)
Age: 38
End: 2023-08-02

## 2023-08-02 VITALS
DIASTOLIC BLOOD PRESSURE: 81 MMHG | SYSTOLIC BLOOD PRESSURE: 118 MMHG | TEMPERATURE: 97.9 F | HEART RATE: 109 BPM | BODY MASS INDEX: 31.45 KG/M2 | WEIGHT: 213 LBS | OXYGEN SATURATION: 98 %

## 2023-08-02 DIAGNOSIS — R42 DIZZINESS ON STANDING: ICD-10-CM

## 2023-08-02 DIAGNOSIS — H60.331 ACUTE SWIMMER'S EAR OF RIGHT SIDE: Primary | ICD-10-CM

## 2023-08-02 DIAGNOSIS — H69.81 DYSFUNCTION OF RIGHT EUSTACHIAN TUBE: ICD-10-CM

## 2023-08-02 RX ORDER — CIPROFLOXACIN AND DEXAMETHASONE 3; 1 MG/ML; MG/ML
4 SUSPENSION/ DROPS AURICULAR (OTIC) 2 TIMES DAILY
Qty: 7.5 ML | Refills: 0 | Status: SHIPPED | OUTPATIENT
Start: 2023-08-02 | End: 2023-08-09

## 2023-08-02 NOTE — PATIENT INSTRUCTIONS
- Take OTC NSAIDS (ibuprofen, motrin, aleve, advil, naproxen) 600-800mg every 6-8 hours for pain and discomfort for the next several days    - Avoid moisture to ear, do not go swimming or submerge head into water during duration of treatment. Use shower cap when showering.    - treat for one week, continue for an additional week if symptoms have not resolved. Symptoms persisting after one to two weeks warrants follow up    - Consider using OTC nasacort and a daily non drowsy allergy medication such as zyrtec, claritin or allegra for fluid in ear.

## 2023-08-02 NOTE — PROGRESS NOTES
otic suspension; Place 4 drops into the right ear 2 times daily for 7 days  Dispense: 7.5 mL; Refill: 0  - Avoid moisture in ear, no swimming during treatment duration. Wear shower cap when showering. Follow up as needed. May need ENT if symptoms does not improve. 2. Dysfunction of right eustachian tube  - Discussed OTC allergy medications that will aid with improvement of symptoms. 3. Dizziness on standing   - Discussed and recommended meclazine for vertigo like symptoms. Slow changing positions to minimize symptoms.     NAE Cuba - NP

## 2023-08-15 ENCOUNTER — TELEPHONE (OUTPATIENT)
Age: 38
End: 2023-08-15

## 2023-08-15 NOTE — TELEPHONE ENCOUNTER
----- Message from Grant Brar sent at 8/15/2023 12:44 PM EDT -----  Subject: Message to Provider    QUESTIONS  Information for Provider? patient was returning a call regarding his apt   has on 22nd. Wanted to know what the call was for ?   ---------------------------------------------------------------------------  --------------  [address]  [phone]; OK to leave message on voicemail  ---------------------------------------------------------------------------  --------------  SCRIPT ANSWERS  Relationship to Patient?  Self

## 2023-08-17 ENCOUNTER — OFFICE VISIT (OUTPATIENT)
Age: 38
End: 2023-08-17
Payer: COMMERCIAL

## 2023-08-17 VITALS
RESPIRATION RATE: 16 BRPM | HEIGHT: 69 IN | DIASTOLIC BLOOD PRESSURE: 85 MMHG | TEMPERATURE: 97.9 F | OXYGEN SATURATION: 98 % | SYSTOLIC BLOOD PRESSURE: 117 MMHG | HEART RATE: 104 BPM | BODY MASS INDEX: 30.07 KG/M2 | WEIGHT: 203 LBS

## 2023-08-17 DIAGNOSIS — H92.03 EAR PAIN, BILATERAL: Primary | ICD-10-CM

## 2023-08-17 PROCEDURE — 99213 OFFICE O/P EST LOW 20 MIN: CPT | Performed by: FAMILY MEDICINE

## 2023-08-17 RX ORDER — CIPROFLOXACIN AND DEXAMETHASONE 3; 1 MG/ML; MG/ML
4 SUSPENSION/ DROPS AURICULAR (OTIC) 2 TIMES DAILY
COMMUNITY

## 2023-08-17 SDOH — ECONOMIC STABILITY: TRANSPORTATION INSECURITY
IN THE PAST 12 MONTHS, HAS LACK OF TRANSPORTATION KEPT YOU FROM MEETINGS, WORK, OR FROM GETTING THINGS NEEDED FOR DAILY LIVING?: NO

## 2023-08-17 SDOH — ECONOMIC STABILITY: INCOME INSECURITY: HOW HARD IS IT FOR YOU TO PAY FOR THE VERY BASICS LIKE FOOD, HOUSING, MEDICAL CARE, AND HEATING?: NOT VERY HARD

## 2023-08-17 SDOH — ECONOMIC STABILITY: FOOD INSECURITY: WITHIN THE PAST 12 MONTHS, YOU WORRIED THAT YOUR FOOD WOULD RUN OUT BEFORE YOU GOT MONEY TO BUY MORE.: NEVER TRUE

## 2023-08-17 SDOH — ECONOMIC STABILITY: FOOD INSECURITY: WITHIN THE PAST 12 MONTHS, THE FOOD YOU BOUGHT JUST DIDN'T LAST AND YOU DIDN'T HAVE MONEY TO GET MORE.: NEVER TRUE

## 2023-08-17 SDOH — ECONOMIC STABILITY: HOUSING INSECURITY
IN THE LAST 12 MONTHS, WAS THERE A TIME WHEN YOU DID NOT HAVE A STEADY PLACE TO SLEEP OR SLEPT IN A SHELTER (INCLUDING NOW)?: NO

## 2023-08-17 ASSESSMENT — ANXIETY QUESTIONNAIRES
3. WORRYING TOO MUCH ABOUT DIFFERENT THINGS: 0
4. TROUBLE RELAXING: 0
GAD7 TOTAL SCORE: 0
IF YOU CHECKED OFF ANY PROBLEMS ON THIS QUESTIONNAIRE, HOW DIFFICULT HAVE THESE PROBLEMS MADE IT FOR YOU TO DO YOUR WORK, TAKE CARE OF THINGS AT HOME, OR GET ALONG WITH OTHER PEOPLE: NOT DIFFICULT AT ALL
5. BEING SO RESTLESS THAT IT IS HARD TO SIT STILL: 0
1. FEELING NERVOUS, ANXIOUS, OR ON EDGE: 0
6. BECOMING EASILY ANNOYED OR IRRITABLE: 0
7. FEELING AFRAID AS IF SOMETHING AWFUL MIGHT HAPPEN: 0
2. NOT BEING ABLE TO STOP OR CONTROL WORRYING: 0

## 2023-08-17 ASSESSMENT — PATIENT HEALTH QUESTIONNAIRE - PHQ9
SUM OF ALL RESPONSES TO PHQ9 QUESTIONS 1 & 2: 0
SUM OF ALL RESPONSES TO PHQ QUESTIONS 1-9: 0
1. LITTLE INTEREST OR PLEASURE IN DOING THINGS: 0
SUM OF ALL RESPONSES TO PHQ QUESTIONS 1-9: 0
2. FEELING DOWN, DEPRESSED OR HOPELESS: 0

## 2023-09-21 ENCOUNTER — OFFICE VISIT (OUTPATIENT)
Age: 38
End: 2023-09-21
Payer: COMMERCIAL

## 2023-09-21 VITALS
SYSTOLIC BLOOD PRESSURE: 106 MMHG | WEIGHT: 203.8 LBS | DIASTOLIC BLOOD PRESSURE: 68 MMHG | HEART RATE: 97 BPM | RESPIRATION RATE: 20 BRPM | HEIGHT: 69 IN | OXYGEN SATURATION: 98 % | TEMPERATURE: 97.7 F | BODY MASS INDEX: 30.18 KG/M2

## 2023-09-21 DIAGNOSIS — Z00.00 ENCOUNTER FOR GENERAL ADULT MEDICAL EXAMINATION WITHOUT ABNORMAL FINDINGS: ICD-10-CM

## 2023-09-21 DIAGNOSIS — H92.03 EAR PAIN, BILATERAL: ICD-10-CM

## 2023-09-21 DIAGNOSIS — Z00.00 ANNUAL PHYSICAL EXAM: Primary | ICD-10-CM

## 2023-09-21 DIAGNOSIS — Z23 ENCOUNTER FOR IMMUNIZATION: ICD-10-CM

## 2023-09-21 DIAGNOSIS — E11.9 TYPE 2 DIABETES MELLITUS WITHOUT COMPLICATION, WITH LONG-TERM CURRENT USE OF INSULIN (HCC): ICD-10-CM

## 2023-09-21 DIAGNOSIS — Z79.4 TYPE 2 DIABETES MELLITUS WITHOUT COMPLICATION, WITH LONG-TERM CURRENT USE OF INSULIN (HCC): ICD-10-CM

## 2023-09-21 PROCEDURE — 90474 IMMUNE ADMIN ORAL/NASAL ADDL: CPT | Performed by: INTERNAL MEDICINE

## 2023-09-21 PROCEDURE — 90677 PCV20 VACCINE IM: CPT | Performed by: INTERNAL MEDICINE

## 2023-09-21 PROCEDURE — 99395 PREV VISIT EST AGE 18-39: CPT | Performed by: INTERNAL MEDICINE

## 2023-09-21 PROCEDURE — 90471 IMMUNIZATION ADMIN: CPT | Performed by: INTERNAL MEDICINE

## 2023-09-21 SDOH — ECONOMIC STABILITY: INCOME INSECURITY: HOW HARD IS IT FOR YOU TO PAY FOR THE VERY BASICS LIKE FOOD, HOUSING, MEDICAL CARE, AND HEATING?: NOT HARD AT ALL

## 2023-09-21 SDOH — ECONOMIC STABILITY: FOOD INSECURITY: WITHIN THE PAST 12 MONTHS, THE FOOD YOU BOUGHT JUST DIDN'T LAST AND YOU DIDN'T HAVE MONEY TO GET MORE.: NEVER TRUE

## 2023-09-21 SDOH — ECONOMIC STABILITY: FOOD INSECURITY: WITHIN THE PAST 12 MONTHS, YOU WORRIED THAT YOUR FOOD WOULD RUN OUT BEFORE YOU GOT MONEY TO BUY MORE.: NEVER TRUE

## 2023-09-21 ASSESSMENT — PATIENT HEALTH QUESTIONNAIRE - PHQ9
SUM OF ALL RESPONSES TO PHQ QUESTIONS 1-9: 0
SUM OF ALL RESPONSES TO PHQ9 QUESTIONS 1 & 2: 0
2. FEELING DOWN, DEPRESSED OR HOPELESS: 0
SUM OF ALL RESPONSES TO PHQ QUESTIONS 1-9: 0
SUM OF ALL RESPONSES TO PHQ QUESTIONS 1-9: 0
1. LITTLE INTEREST OR PLEASURE IN DOING THINGS: 0
SUM OF ALL RESPONSES TO PHQ QUESTIONS 1-9: 0

## 2023-09-21 NOTE — PROGRESS NOTES
1. \"Have you been to the ER, urgent care clinic since your last visit? Hospitalized since your last visit? yes, [facility]. Ear pain/ pressure off and on. See chart. 2. \"Have you seen or consulted any other health care providers outside of the [address] [place] [address] since your last visit? \" No     3. For patients aged 43-73: Has the patient had a colonoscopy / FIT/ Cologuard? NA - based on age      If the patient is female:    4. For patients aged 43-66: Has the patient had a mammogram within the past 2 years? NA - based on age or sex      11. For patients aged 21-65: Has the patient had a pap smear?  NA - based on age or sex
UNIT/ML injection pen INJECT 50 UNITS UNDER THE SKIN DAILY      metFORMIN (GLUCOPHAGE-XR) 500 MG extended release tablet 2 tablets in the morning and at bedtime       No current facility-administered medications on file prior to visit. family history includes Heart Attack in his father and paternal grandfather; Hypertension in his father. Social History     Socioeconomic History    Marital status: Single     Spouse name: Not on file    Number of children: Not on file    Years of education: Not on file    Highest education level: Not on file   Occupational History    Not on file   Tobacco Use    Smoking status: Never     Passive exposure: Never    Smokeless tobacco: Never   Vaping Use    Vaping Use: Never used   Substance and Sexual Activity    Alcohol use: No     Alcohol/week: 0.0 standard drinks of alcohol    Drug use: No    Sexual activity: Not on file   Other Topics Concern    Not on file   Social History Narrative    Not on file     Social Determinants of Health     Financial Resource Strain: Low Risk  (9/21/2023)    Overall Financial Resource Strain (CARDIA)     Difficulty of Paying Living Expenses: Not hard at all   Food Insecurity: No Food Insecurity (9/21/2023)    Hunger Vital Sign     Worried About Running Out of Food in the Last Year: Never true     [address] in the Last Year: Never true   Transportation Needs: Unknown (9/21/2023)    PRAPARE - Transportation     Lack of Transportation (Medical): Not on file     Lack of Transportation (Non-Medical):  No   Physical Activity: Not on file   Stress: Not on file   Social Connections: Not on file   Intimate Partner Violence: Not on file   Housing Stability: Unknown (9/21/2023)    Housing Stability Vital Sign     Unable to Pay for Housing in the Last Year: Not on file     Number of Places Lived in the Last Year: Not on file     Unstable Housing in the Last Year: No       /68 (Site: Left Upper Arm, Position: Sitting, Cuff Size: Medium Adult)

## 2023-09-27 ENCOUNTER — OFFICE VISIT (OUTPATIENT)
Age: 38
End: 2023-09-27
Payer: COMMERCIAL

## 2023-09-27 VITALS
DIASTOLIC BLOOD PRESSURE: 77 MMHG | HEIGHT: 69 IN | HEART RATE: 89 BPM | SYSTOLIC BLOOD PRESSURE: 108 MMHG | BODY MASS INDEX: 30.24 KG/M2 | WEIGHT: 204.2 LBS

## 2023-09-27 DIAGNOSIS — E11.9 TYPE 2 DIABETES MELLITUS WITHOUT COMPLICATION, WITH LONG-TERM CURRENT USE OF INSULIN (HCC): Primary | ICD-10-CM

## 2023-09-27 DIAGNOSIS — Z79.4 TYPE 2 DIABETES MELLITUS WITHOUT COMPLICATION, WITH LONG-TERM CURRENT USE OF INSULIN (HCC): Primary | ICD-10-CM

## 2023-09-27 DIAGNOSIS — E78.00 PURE HYPERCHOLESTEROLEMIA, UNSPECIFIED: ICD-10-CM

## 2023-09-27 LAB — HBA1C MFR BLD: 7 %

## 2023-09-27 PROCEDURE — 83036 HEMOGLOBIN GLYCOSYLATED A1C: CPT | Performed by: INTERNAL MEDICINE

## 2023-09-27 PROCEDURE — 3051F HG A1C>EQUAL 7.0%<8.0%: CPT | Performed by: INTERNAL MEDICINE

## 2023-09-27 PROCEDURE — 99214 OFFICE O/P EST MOD 30 MIN: CPT | Performed by: INTERNAL MEDICINE

## 2023-09-27 NOTE — PROGRESS NOTES
Chief Complaint   Patient presents with    Diabetes     Pcp and pharmacy verified     History of Present Illness: Aide Del Rio. is a 40 y.o. male here for follow up of diabetes. n January 2016 he was having issues of polyuria and polydipsia and was told by his friend, who is diabetic, was told he was probably diabetic as well. He notes that he checked his BG that day was 500. [facility] and his FGB was in the mid-200's and he was started on Metformin 500mg BID. A month later his FBG was 180's and his A1C was 10.6%. At our initial visit in March 2016 I tested pt for NIKKIE and Anti-insulin Ab, both of which were negative, his insulin and C-peptide levels were normal.    At our last visit in May 2023 his A1C was 7.8%, but he had been experiencing low BG since we started the [facility] and was decreasing his Novolog. I retested his NIKKIE, which was negative, but his ZNT8Ab was >500. His c-peptide was 1.2 in the face of a BG of 137. His LDL was down to 123 and his TC was 191 with HDL 35 and . \"The [facility] has helped, my blood sugars are better and I am eating a lot less. I have had to eat less well to prevent low BG. If I eat low carb I will get episodes of BG in the middle of the night. I have been able to catch it by the 70s, but I have had them in the 45s. \"    Pt denies any recent illnesses, injuries or hospitalizations. Pt notes that over the last two months, he has had issues of an ear problem \"they can not figure out what it is, but they have been giving me Abx and steroids. \"    Pt is taking Mounjaro 10mg every Wednesday, Metfomrin 1000mg BID and Basaglar 50 units HS. His A1C today was 7.0%. His weight today was down from 215 pounds to 204 pounds. \"We moved out to [facility] in June and now my chickens laid their first eggs this week. He notes his new job in the [employer] is going well as well. \"    He wakes around 7AM.  - He rarely eats

## 2024-01-01 DIAGNOSIS — E11.9 TYPE 2 DIABETES MELLITUS WITHOUT COMPLICATION, WITH LONG-TERM CURRENT USE OF INSULIN (HCC): ICD-10-CM

## 2024-01-01 DIAGNOSIS — Z79.4 TYPE 2 DIABETES MELLITUS WITHOUT COMPLICATION, WITH LONG-TERM CURRENT USE OF INSULIN (HCC): ICD-10-CM

## 2024-01-01 DIAGNOSIS — E78.00 PURE HYPERCHOLESTEROLEMIA, UNSPECIFIED: ICD-10-CM

## 2024-01-25 RX ORDER — TIRZEPATIDE 10 MG/.5ML
INJECTION, SOLUTION SUBCUTANEOUS
Qty: 2 ML | Refills: 0 | Status: SHIPPED | OUTPATIENT
Start: 2024-01-25

## 2024-01-30 ENCOUNTER — OFFICE VISIT (OUTPATIENT)
Age: 39
End: 2024-01-30
Payer: COMMERCIAL

## 2024-01-30 VITALS
DIASTOLIC BLOOD PRESSURE: 67 MMHG | WEIGHT: 199.8 LBS | HEART RATE: 96 BPM | SYSTOLIC BLOOD PRESSURE: 97 MMHG | BODY MASS INDEX: 29.59 KG/M2 | HEIGHT: 69 IN

## 2024-01-30 DIAGNOSIS — Z79.4 TYPE 2 DIABETES MELLITUS WITHOUT COMPLICATION, WITH LONG-TERM CURRENT USE OF INSULIN (HCC): Primary | ICD-10-CM

## 2024-01-30 DIAGNOSIS — E78.00 PURE HYPERCHOLESTEROLEMIA, UNSPECIFIED: ICD-10-CM

## 2024-01-30 DIAGNOSIS — E11.9 TYPE 2 DIABETES MELLITUS WITHOUT COMPLICATION, WITH LONG-TERM CURRENT USE OF INSULIN (HCC): Primary | ICD-10-CM

## 2024-01-30 LAB — HBA1C MFR BLD: 8 %

## 2024-01-30 PROCEDURE — 99214 OFFICE O/P EST MOD 30 MIN: CPT | Performed by: INTERNAL MEDICINE

## 2024-01-30 PROCEDURE — 83036 HEMOGLOBIN GLYCOSYLATED A1C: CPT | Performed by: INTERNAL MEDICINE

## 2024-01-30 NOTE — PROGRESS NOTES
Chief Complaint   Patient presents with    Diabetes     Pcp and pharmacy verified     History of Present Illness: Izaiah Sky Jr. is a 38 y.o. male here for follow up of diabetes.  In January 2016 he was having issues of polyuria and polydipsia and was told by his friend, who is diabetic, was told he was probably diabetic as well. He notes that he checked his BG that day was 500. [facility] and his FGB was in the mid-200's and he was started on Metformin 500mg BID. A month later his FBG was 180's and his A1C was 10.6%.  At our initial visit in March 2016 I tested pt for NIKKIE and Anti-insulin Ab, both of which were negative, his insulin and C-peptide levels were normal.    At our last visit in September 2023 his A1C was down to 7.0%. He had been having issues of hypoglycemia and was  eating higher carbs to prevent low BG. I instructed him to decrease his Basaglar to 40 units HS, continue the Metformin XR 1000mg BID and Mounjaro 10mg weekly.      Pt denies any recent illnesses, injuries or hospitalizations.    \"I have been working a lot, working out less and spending more time on my Xbox.\"    \"I had COVID in November 2023.\"    Pt is taking Mounjaro 10mg every Wednesday \"there are times I have not been able to get it so I have missed doses, but for the last two months they have been filled.\"  He is taking Metfomrin 1000mg BID and Basaglar 40 units HS.  He notes he has had a couple BG in the 60s, but that is rare.  \"I have had BG in the 60s twice since our last visit.\"    His A1C today was 8.0%.  His weight is down from 204 pounds in September 2023 to 199 pounds.    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  - He rarely eats breakfast.   - He has lunch around 1PM, yesterday he had

## 2024-02-12 ENCOUNTER — TELEPHONE (OUTPATIENT)
Age: 39
End: 2024-02-12

## 2024-02-12 NOTE — TELEPHONE ENCOUNTER
Patient states he needs a call back to discuss Plan of Care or getting worked in for appt today as he is off work today for Chest pain w/Onset approx 2 months ago & also possible Deep Vein Thrombosis. Please call to discuss Plan of care. Thank you

## 2024-02-14 RX ORDER — METFORMIN HYDROCHLORIDE 500 MG/1
TABLET, EXTENDED RELEASE ORAL
Qty: 360 TABLET | Refills: 1 | Status: SHIPPED | OUTPATIENT
Start: 2024-02-14

## 2024-02-22 RX ORDER — TIRZEPATIDE 10 MG/.5ML
INJECTION, SOLUTION SUBCUTANEOUS
Qty: 2 ML | Refills: 3 | Status: SHIPPED | OUTPATIENT
Start: 2024-02-22

## 2024-03-21 ENCOUNTER — OFFICE VISIT (OUTPATIENT)
Age: 39
End: 2024-03-21
Payer: COMMERCIAL

## 2024-03-21 VITALS
TEMPERATURE: 98.3 F | HEART RATE: 91 BPM | WEIGHT: 203.6 LBS | RESPIRATION RATE: 18 BRPM | HEIGHT: 68 IN | OXYGEN SATURATION: 96 % | DIASTOLIC BLOOD PRESSURE: 77 MMHG | SYSTOLIC BLOOD PRESSURE: 109 MMHG | BODY MASS INDEX: 30.86 KG/M2

## 2024-03-21 DIAGNOSIS — Z23 ENCOUNTER FOR IMMUNIZATION: ICD-10-CM

## 2024-03-21 DIAGNOSIS — Z79.4 TYPE 2 DIABETES MELLITUS WITHOUT COMPLICATION, WITH LONG-TERM CURRENT USE OF INSULIN (HCC): Primary | ICD-10-CM

## 2024-03-21 DIAGNOSIS — E11.9 TYPE 2 DIABETES MELLITUS WITHOUT COMPLICATION, WITH LONG-TERM CURRENT USE OF INSULIN (HCC): Primary | ICD-10-CM

## 2024-03-21 PROCEDURE — 90715 TDAP VACCINE 7 YRS/> IM: CPT | Performed by: INTERNAL MEDICINE

## 2024-03-21 PROCEDURE — 90471 IMMUNIZATION ADMIN: CPT | Performed by: INTERNAL MEDICINE

## 2024-03-21 PROCEDURE — 99214 OFFICE O/P EST MOD 30 MIN: CPT | Performed by: INTERNAL MEDICINE

## 2024-03-21 RX ORDER — TIRZEPATIDE 10 MG/.5ML
INJECTION, SOLUTION SUBCUTANEOUS
Qty: 2 ML | Refills: 3 | Status: SHIPPED | OUTPATIENT
Start: 2024-03-21

## 2024-03-21 ASSESSMENT — PATIENT HEALTH QUESTIONNAIRE - PHQ9
SUM OF ALL RESPONSES TO PHQ QUESTIONS 1-9: 0
2. FEELING DOWN, DEPRESSED OR HOPELESS: NOT AT ALL
SUM OF ALL RESPONSES TO PHQ QUESTIONS 1-9: 0
SUM OF ALL RESPONSES TO PHQ9 QUESTIONS 1 & 2: 0
1. LITTLE INTEREST OR PLEASURE IN DOING THINGS: NOT AT ALL

## 2024-03-21 NOTE — PROGRESS NOTES
Mr. Izaiah Sky Jr. is presenting to follow up     CC:  Discuss Medications (Issues finding medication at [facility]) and Shortness of Breath (SOB, on and off for 15 mins)       HPI:    Mr. Izaiah Sky Jr.   is a 38 y.o. male with a hx of diabetes presenting to follow up        Saw Dr Casanova jan 2023 saw Dr Casanova reviewed the visit   \"Pt to continue the Basaglar 40 HS, Mounjaro 10mg weekly and Metformin XR 1000mg BID\"   Has been out of mounjaro for 8 weeks \" I can't find it\"    Patient reports   Feeling \" not gettting enough oxygen \" went on for months but resolved.  Reports over x mas break bough cheap energy drinks and thinks it was the cause    Today no dyspena and no chest pain     [facility] and [facility] of chest normal       Review of systems:  Constitutional: negative for fever, chills, weight loss, night sweats   10 systems reviewed and negative other then HPI     Past Medical History:   Diagnosis Date    Diabetes (HCC)     Gastrointestinal disorder     ulcerative colitis ?    Idiopathic urticaria     Sleep disorder     apnea    UC (ulcerative colitis) (HCC)     resolved per patient after azathioprine during childhood        Past Surgical History:   Procedure Laterality Date    CYST REMOVAL      OTHER SURGICAL HISTORY      pylonidal cyst       No Known Allergies    Current Outpatient Medications on File Prior to Visit   Medication Sig Dispense Refill    Tirzepatide (MOUNJARO) 10 MG/0.5ML SOPN SC injection INJECT 10 MG UNDER THE SKIN ONCE WEEKLY 2 mL 3    metFORMIN (GLUCOPHAGE-XR) 500 MG extended release tablet TAKE TWO TABLETS BY MOUTH EVERY MORNING AND TAKE TWO TABLETS BY MOUTH EVERY EVENING WITH DINNER 360 tablet 1    insulin glargine (BASAGLAR KWIKPEN) 100 UNIT/ML injection pen 40 Units nightly       No current facility-administered medications on file prior to visit.       family history includes Heart Attack in his father and paternal grandfather; Hypertension in his

## 2024-03-21 NOTE — PROGRESS NOTES
Chief Complaint   Patient presents with    Discuss Medications     Issues finding medication at [facility]    Shortness of Breath     SOB, on and off for 15 mins     \"Have you been to the ER, urgent care clinic since your last visit?  Hospitalized since your last visit?\"    [facility] about 2 months ago.    “Have you seen or consulted any other health care providers outside of [facility] since your last visit?”    Yes, [facility] for SOB

## 2024-03-22 RX ORDER — INSULIN GLARGINE 100 [IU]/ML
INJECTION, SOLUTION SUBCUTANEOUS
Qty: 15 ML | Refills: 0 | Status: SHIPPED | OUTPATIENT
Start: 2024-03-22

## 2024-03-26 NOTE — TELEPHONE ENCOUNTER
Spoke with patient. He states that he is only eating about 30 carbs per day. He has dramatically reduced his carb intake, but his blood sugar is going up, not down. Fasting blood sugars: 130-145  Bedtime blood sugars: 150-190. He is taking 20 units of Basaglar at bedtime. He states he is wondering if he has insulin resistance. He said if he does not answer at his work number, call his cell at [phone]. Show Applicator Variable?: Yes Post-Care Instructions: I reviewed with the patient in detail post-care instructions. Patient is to wear sunprotection, and avoid picking at any of the treated lesions. Pt may apply Aquaphor to crusted or scabbing areas. Detail Level: Detailed Aperture Size (Optional): C Consent: VERBAL consent was obtained including but not limited to risks of crusting, scabbing, blistering, scarring, darker or lighter pigmentary change, recurrence, incomplete removal and infection. Render Note In Bullet Format When Appropriate: No Number Of Freeze-Thaw Cycles: 1 freeze-thaw cycle Duration Of Freeze Thaw-Cycle (Seconds): 2

## 2024-05-01 DIAGNOSIS — E78.00 PURE HYPERCHOLESTEROLEMIA, UNSPECIFIED: ICD-10-CM

## 2024-05-01 DIAGNOSIS — Z79.4 TYPE 2 DIABETES MELLITUS WITHOUT COMPLICATION, WITH LONG-TERM CURRENT USE OF INSULIN (HCC): ICD-10-CM

## 2024-05-01 DIAGNOSIS — E11.9 TYPE 2 DIABETES MELLITUS WITHOUT COMPLICATION, WITH LONG-TERM CURRENT USE OF INSULIN (HCC): ICD-10-CM

## 2024-05-30 RX ORDER — INSULIN GLARGINE 100 [IU]/ML
INJECTION, SOLUTION SUBCUTANEOUS
Qty: 15 ML | Refills: 3 | Status: SHIPPED | OUTPATIENT
Start: 2024-05-30

## 2024-06-06 ENCOUNTER — OFFICE VISIT (OUTPATIENT)
Age: 39
End: 2024-06-06
Payer: COMMERCIAL

## 2024-06-06 VITALS
RESPIRATION RATE: 12 BRPM | DIASTOLIC BLOOD PRESSURE: 65 MMHG | WEIGHT: 198 LBS | HEART RATE: 70 BPM | BODY MASS INDEX: 30.11 KG/M2 | SYSTOLIC BLOOD PRESSURE: 110 MMHG

## 2024-06-06 DIAGNOSIS — E78.00 PURE HYPERCHOLESTEROLEMIA, UNSPECIFIED: ICD-10-CM

## 2024-06-06 DIAGNOSIS — Z79.4 TYPE 2 DIABETES MELLITUS WITHOUT COMPLICATION, WITH LONG-TERM CURRENT USE OF INSULIN (HCC): Primary | ICD-10-CM

## 2024-06-06 DIAGNOSIS — E11.9 TYPE 2 DIABETES MELLITUS WITHOUT COMPLICATION, WITH LONG-TERM CURRENT USE OF INSULIN (HCC): Primary | ICD-10-CM

## 2024-06-06 LAB
ALBUMIN SERPL-MCNC: 3.8 G/DL (ref 3.5–5)
ALBUMIN/GLOB SERPL: 1.3 (ref 1.1–2.2)
ALP SERPL-CCNC: 61 U/L (ref 45–117)
ALT SERPL-CCNC: 31 U/L (ref 12–78)
ANION GAP SERPL CALC-SCNC: 6 MMOL/L (ref 5–15)
AST SERPL-CCNC: 14 U/L (ref 15–37)
BILIRUB SERPL-MCNC: 0.4 MG/DL (ref 0.2–1)
BUN SERPL-MCNC: 15 MG/DL (ref 6–20)
BUN/CREAT SERPL: 21 (ref 12–20)
CALCIUM SERPL-MCNC: 9.7 MG/DL (ref 8.5–10.1)
CHLORIDE SERPL-SCNC: 105 MMOL/L (ref 97–108)
CHOLEST SERPL-MCNC: 144 MG/DL
CO2 SERPL-SCNC: 27 MMOL/L (ref 21–32)
CREAT SERPL-MCNC: 0.73 MG/DL (ref 0.7–1.3)
EST. AVERAGE GLUCOSE BLD GHB EST-MCNC: 151 MG/DL
GLOBULIN SER CALC-MCNC: 3 G/DL (ref 2–4)
GLUCOSE SERPL-MCNC: 122 MG/DL (ref 65–100)
HBA1C MFR BLD: 6.9 % (ref 4–5.6)
HDLC SERPL-MCNC: 35 MG/DL
HDLC SERPL: 4.1 (ref 0–5)
LDLC SERPL CALC-MCNC: 88.6 MG/DL (ref 0–100)
POTASSIUM SERPL-SCNC: 4.3 MMOL/L (ref 3.5–5.1)
PROT SERPL-MCNC: 6.8 G/DL (ref 6.4–8.2)
SODIUM SERPL-SCNC: 138 MMOL/L (ref 136–145)
TRIGL SERPL-MCNC: 102 MG/DL
VLDLC SERPL CALC-MCNC: 20.4 MG/DL

## 2024-06-06 PROCEDURE — 99214 OFFICE O/P EST MOD 30 MIN: CPT | Performed by: INTERNAL MEDICINE

## 2024-06-06 NOTE — PROGRESS NOTES
Chief Complaint   Patient presents with    Diabetes    Follow-up     History of Present Illness: Izaiah Sky Jr. is a 38 y.o. male here for follow up of diabetes.  In January 2016 he was having issues of polyuria and polydipsia and was told by his friend, who is diabetic, was told he was probably diabetic as well. He notes that he checked his BG that day was 500. The [facility] and his FGB was in the mid-200's and he was started on Metformin 500mg BID. A month later his FBG was 180's and his A1C was 10.6%.  At our initial visit in March 2016 I tested pt for NIKKIE and Anti-insulin Ab, both of which were negative, his insulin and C-peptide levels were normal.    At our last visit in January 2024 his A1C was 8.0%, his weight was down to 199 pounds.  He was not having the episodes of hypoglycemia. I instructed hi to continue the Basaglar 40 HS, Mounjaro 10mg weekly and Metformin XR 1000mg BID.    \"I went through a long stretch of time where I could not get my Mounjaro, my PCP was very concerned and I have since been getting it at [facility].    He has been back on the Mounjaro 10mg every Monday since March 2024. He is taking Basaglar 40 units HS and Metformin XR 1000mg BID.    Pt denies any recent illnesses, injuries or hospitalizations.    He has not been testing his BG.    His weight is down from 199 pounds in January 2024 to 198 pounds.     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  - He rarely eats breakfast.   - He has lunch around Noon, yesterday he had chicken tenders and diet soda.         - He generally will go for a walk at lunch time.     - His has dinner between 6-7PM, last night he had chicken pasta, strawberries and unsweetened iced tea.                 - He has not been snacking typically.    Pt adopted a puppy \"he is gigantic and he is the

## 2024-06-07 LAB — LDLC SERPL DIRECT ASSAY-MCNC: 88 MG/DL (ref 0–99)

## 2024-07-30 DIAGNOSIS — Z79.4 TYPE 2 DIABETES MELLITUS WITHOUT COMPLICATION, WITH LONG-TERM CURRENT USE OF INSULIN (HCC): ICD-10-CM

## 2024-07-30 DIAGNOSIS — E11.9 TYPE 2 DIABETES MELLITUS WITHOUT COMPLICATION, WITH LONG-TERM CURRENT USE OF INSULIN (HCC): ICD-10-CM

## 2024-07-31 RX ORDER — TIRZEPATIDE 10 MG/.5ML
INJECTION, SOLUTION SUBCUTANEOUS
Qty: 2 ML | Refills: 2 | Status: SHIPPED | OUTPATIENT
Start: 2024-07-31

## 2024-08-30 RX ORDER — METFORMIN HCL 500 MG
TABLET, EXTENDED RELEASE 24 HR ORAL
Qty: 360 TABLET | Refills: 1 | Status: SHIPPED | OUTPATIENT
Start: 2024-08-30

## 2024-10-15 DIAGNOSIS — E11.9 TYPE 2 DIABETES MELLITUS WITHOUT COMPLICATION, WITH LONG-TERM CURRENT USE OF INSULIN (HCC): ICD-10-CM

## 2024-10-15 DIAGNOSIS — Z79.4 TYPE 2 DIABETES MELLITUS WITHOUT COMPLICATION, WITH LONG-TERM CURRENT USE OF INSULIN (HCC): ICD-10-CM

## 2024-10-15 RX ORDER — TIRZEPATIDE 10 MG/.5ML
INJECTION, SOLUTION SUBCUTANEOUS
Qty: 2 ML | Refills: 2 | Status: SHIPPED | OUTPATIENT
Start: 2024-10-15

## 2024-10-22 ENCOUNTER — OFFICE VISIT (OUTPATIENT)
Age: 39
End: 2024-10-22
Payer: COMMERCIAL

## 2024-10-22 VITALS
HEIGHT: 68 IN | WEIGHT: 194.2 LBS | DIASTOLIC BLOOD PRESSURE: 60 MMHG | BODY MASS INDEX: 29.43 KG/M2 | SYSTOLIC BLOOD PRESSURE: 110 MMHG | HEART RATE: 89 BPM

## 2024-10-22 DIAGNOSIS — E78.00 PURE HYPERCHOLESTEROLEMIA, UNSPECIFIED: ICD-10-CM

## 2024-10-22 DIAGNOSIS — Z79.4 TYPE 2 DIABETES MELLITUS WITHOUT COMPLICATION, WITH LONG-TERM CURRENT USE OF INSULIN (HCC): Primary | ICD-10-CM

## 2024-10-22 DIAGNOSIS — E11.9 TYPE 2 DIABETES MELLITUS WITHOUT COMPLICATION, WITH LONG-TERM CURRENT USE OF INSULIN (HCC): Primary | ICD-10-CM

## 2024-10-22 LAB — HBA1C MFR BLD: 6.9 %

## 2024-10-22 PROCEDURE — 83036 HEMOGLOBIN GLYCOSYLATED A1C: CPT | Performed by: INTERNAL MEDICINE

## 2024-10-22 PROCEDURE — 99214 OFFICE O/P EST MOD 30 MIN: CPT | Performed by: INTERNAL MEDICINE

## 2024-10-22 PROCEDURE — 3044F HG A1C LEVEL LT 7.0%: CPT | Performed by: INTERNAL MEDICINE

## 2024-10-22 RX ORDER — METFORMIN HYDROCHLORIDE 500 MG/1
TABLET, EXTENDED RELEASE ORAL
Qty: 360 TABLET | Refills: 1 | Status: SHIPPED | OUTPATIENT
Start: 2024-10-22

## 2024-10-22 RX ORDER — INSULIN GLARGINE 100 [IU]/ML
INJECTION, SOLUTION SUBCUTANEOUS
Qty: 45 ML | Refills: 1 | Status: SHIPPED | OUTPATIENT
Start: 2024-10-22

## 2024-10-23 LAB
ALBUMIN SERPL-MCNC: 4 G/DL (ref 3.5–5)
ALBUMIN/GLOB SERPL: 1.2 (ref 1.1–2.2)
ALP SERPL-CCNC: 65 U/L (ref 45–117)
ALT SERPL-CCNC: 48 U/L (ref 12–78)
ANION GAP SERPL CALC-SCNC: 5 MMOL/L (ref 2–12)
AST SERPL-CCNC: 19 U/L (ref 15–37)
BILIRUB SERPL-MCNC: 0.5 MG/DL (ref 0.2–1)
BUN SERPL-MCNC: 17 MG/DL (ref 6–20)
BUN/CREAT SERPL: 23 (ref 12–20)
CALCIUM SERPL-MCNC: 9.5 MG/DL (ref 8.5–10.1)
CHLORIDE SERPL-SCNC: 103 MMOL/L (ref 97–108)
CHOLEST SERPL-MCNC: 173 MG/DL
CO2 SERPL-SCNC: 30 MMOL/L (ref 21–32)
CREAT SERPL-MCNC: 0.74 MG/DL (ref 0.7–1.3)
CREAT UR-MCNC: 208 MG/DL
EST. AVERAGE GLUCOSE BLD GHB EST-MCNC: 140 MG/DL
GLOBULIN SER CALC-MCNC: 3.3 G/DL (ref 2–4)
GLUCOSE SERPL-MCNC: 97 MG/DL (ref 65–100)
HBA1C MFR BLD: 6.5 % (ref 4–5.6)
HDLC SERPL-MCNC: 39 MG/DL
HDLC SERPL: 4.4 (ref 0–5)
LDLC SERPL CALC-MCNC: 114.8 MG/DL (ref 0–100)
MICROALBUMIN UR-MCNC: 1.48 MG/DL
MICROALBUMIN/CREAT UR-RTO: 7 MG/G (ref 0–30)
POTASSIUM SERPL-SCNC: 4 MMOL/L (ref 3.5–5.1)
PROT SERPL-MCNC: 7.3 G/DL (ref 6.4–8.2)
SODIUM SERPL-SCNC: 138 MMOL/L (ref 136–145)
SPECIMEN HOLD: NORMAL
TRIGL SERPL-MCNC: 96 MG/DL
VLDLC SERPL CALC-MCNC: 19.2 MG/DL

## 2025-01-28 DIAGNOSIS — Z79.4 TYPE 2 DIABETES MELLITUS WITHOUT COMPLICATION, WITH LONG-TERM CURRENT USE OF INSULIN (HCC): ICD-10-CM

## 2025-01-28 DIAGNOSIS — E11.9 TYPE 2 DIABETES MELLITUS WITHOUT COMPLICATION, WITH LONG-TERM CURRENT USE OF INSULIN (HCC): ICD-10-CM

## 2025-01-29 RX ORDER — TIRZEPATIDE 10 MG/.5ML
INJECTION, SOLUTION SUBCUTANEOUS
Qty: 2 ML | Refills: 2 | Status: SHIPPED | OUTPATIENT
Start: 2025-01-29

## 2025-01-30 RX ORDER — INSULIN GLARGINE 100 [IU]/ML
INJECTION, SOLUTION SUBCUTANEOUS
Qty: 45 ML | Refills: 1 | Status: SHIPPED | OUTPATIENT
Start: 2025-01-30

## 2025-04-01 DIAGNOSIS — Z79.4 TYPE 2 DIABETES MELLITUS WITHOUT COMPLICATION, WITH LONG-TERM CURRENT USE OF INSULIN: ICD-10-CM

## 2025-04-01 DIAGNOSIS — E11.9 TYPE 2 DIABETES MELLITUS WITHOUT COMPLICATION, WITH LONG-TERM CURRENT USE OF INSULIN: ICD-10-CM

## 2025-04-01 DIAGNOSIS — E78.00 PURE HYPERCHOLESTEROLEMIA, UNSPECIFIED: ICD-10-CM

## 2025-04-24 ENCOUNTER — OFFICE VISIT (OUTPATIENT)
Age: 40
End: 2025-04-24
Payer: COMMERCIAL

## 2025-04-24 VITALS
HEART RATE: 90 BPM | HEIGHT: 68 IN | WEIGHT: 196.6 LBS | DIASTOLIC BLOOD PRESSURE: 78 MMHG | BODY MASS INDEX: 29.8 KG/M2 | SYSTOLIC BLOOD PRESSURE: 108 MMHG

## 2025-04-24 DIAGNOSIS — E11.9 TYPE 2 DIABETES MELLITUS WITHOUT COMPLICATION, WITH LONG-TERM CURRENT USE OF INSULIN (HCC): Primary | ICD-10-CM

## 2025-04-24 DIAGNOSIS — Z79.4 TYPE 2 DIABETES MELLITUS WITHOUT COMPLICATION, WITH LONG-TERM CURRENT USE OF INSULIN (HCC): Primary | ICD-10-CM

## 2025-04-24 DIAGNOSIS — E78.00 PURE HYPERCHOLESTEROLEMIA, UNSPECIFIED: ICD-10-CM

## 2025-04-24 LAB — HBA1C MFR BLD: 7.6 %

## 2025-04-24 PROCEDURE — 3051F HG A1C>EQUAL 7.0%<8.0%: CPT | Performed by: INTERNAL MEDICINE

## 2025-04-24 PROCEDURE — 83036 HEMOGLOBIN GLYCOSYLATED A1C: CPT | Performed by: INTERNAL MEDICINE

## 2025-04-24 PROCEDURE — 99214 OFFICE O/P EST MOD 30 MIN: CPT | Performed by: INTERNAL MEDICINE

## 2025-04-24 NOTE — PROGRESS NOTES
Chief Complaint   Patient presents with    Diabetes     Pcp and pharmacy verified     History of Present Illness: Izaiah Sky Jr. is a 39 y.o. male here for follow up of diabetes.  In January 2016 he was having issues of polyuria and polydipsia and was told by his friend, who is diabetic, was told he was probably diabetic as well. He notes that he checked his BG that day was 500. The [facility] and his FGB was in the mid-200's and he was started on Metformin 500mg BID. A month later his FBG was 180's and his A1C was 10.6%.  At our initial visit in March 2016 I tested pt for NIKKIE and Anti-insulin Ab, both of which were negative, his insulin and C-peptide levels were normal.    At our last visit in October 2024 his A1C was 6.9%. Pt was instructed to continue Basaglar 40 HS, Mounjaro 10mg weekly and Metformin XR 1000mg BID.    Pt denies any recent illnesses, injuries or hospitalizations.    \"I have been going to the gym almost every day. I did gain some weight, but a lot of that was muscle weight\".     Pt is taking Lantus 40 HS, Mounjaro 10mg every Monday and Metformin XR 1000mg BID.    He will test his BG \"randomly\", \"I do think in the last 3 months my sugars have been more variable, than in the past year. When he does test he notes his BG are running in the   I am having times I wake up in the middle of the night with low BG in the 60s. \"When it is in the 60s I feel very bad.\"  There was a couple weeks where it was running in the 200s.    His A1C today was 7.6%.    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 They are dong well and still laying eggs.\"    - He wakes around 730AM.  - He rarely eats breakfast. \"If I do it has been overnight oats, which is very protein heavy.\"   - He has lunch around 1PM, yesterday he had a chicken wrap and a SF energy drink.         - He generally will go for a walk at lunch time.     - His has dinner between 8PM, last night he had a

## 2025-05-19 DIAGNOSIS — Z79.4 TYPE 2 DIABETES MELLITUS WITHOUT COMPLICATION, WITH LONG-TERM CURRENT USE OF INSULIN (HCC): ICD-10-CM

## 2025-05-19 DIAGNOSIS — E11.9 TYPE 2 DIABETES MELLITUS WITHOUT COMPLICATION, WITH LONG-TERM CURRENT USE OF INSULIN (HCC): ICD-10-CM

## 2025-05-20 RX ORDER — TIRZEPATIDE 10 MG/.5ML
INJECTION, SOLUTION SUBCUTANEOUS
Qty: 2 ML | Refills: 2 | Status: SHIPPED | OUTPATIENT
Start: 2025-05-20

## 2025-06-13 ENCOUNTER — TELEPHONE (OUTPATIENT)
Age: 40
End: 2025-06-13

## 2025-06-13 NOTE — TELEPHONE ENCOUNTER
Called [facility] .  Patient has not been seen in a year.   He is on Dr. BRIAN schedule. He will need to be moved to Dr. Ramirez.   If patient calls back please set up apt for 6/23 @11:45.

## 2025-08-27 DIAGNOSIS — E11.9 TYPE 2 DIABETES MELLITUS WITHOUT COMPLICATION, WITH LONG-TERM CURRENT USE OF INSULIN (HCC): ICD-10-CM

## 2025-08-27 DIAGNOSIS — Z79.4 TYPE 2 DIABETES MELLITUS WITHOUT COMPLICATION, WITH LONG-TERM CURRENT USE OF INSULIN (HCC): ICD-10-CM

## 2025-08-28 RX ORDER — TIRZEPATIDE 10 MG/.5ML
INJECTION, SOLUTION SUBCUTANEOUS
Qty: 2 ML | Refills: 0 | Status: SHIPPED | OUTPATIENT
Start: 2025-08-28

## 2025-08-28 RX ORDER — METFORMIN HYDROCHLORIDE 500 MG/1
TABLET, EXTENDED RELEASE ORAL
Qty: 360 TABLET | Refills: 1 | Status: SHIPPED | OUTPATIENT
Start: 2025-08-28